# Patient Record
Sex: MALE | Race: WHITE | Employment: OTHER | URBAN - METROPOLITAN AREA
[De-identification: names, ages, dates, MRNs, and addresses within clinical notes are randomized per-mention and may not be internally consistent; named-entity substitution may affect disease eponyms.]

---

## 2017-01-04 ENCOUNTER — OFFICE VISIT (OUTPATIENT)
Dept: NEUROLOGY | Age: 82
End: 2017-01-04

## 2017-01-04 VITALS
SYSTOLIC BLOOD PRESSURE: 152 MMHG | TEMPERATURE: 98.5 F | DIASTOLIC BLOOD PRESSURE: 82 MMHG | OXYGEN SATURATION: 98 % | BODY MASS INDEX: 22.69 KG/M2 | HEART RATE: 77 BPM | HEIGHT: 65 IN | WEIGHT: 136.2 LBS

## 2017-01-04 DIAGNOSIS — G47.50 PARASOMNIA: ICD-10-CM

## 2017-01-04 DIAGNOSIS — G25.0 BENIGN FAMILIAL TREMOR: ICD-10-CM

## 2017-01-04 DIAGNOSIS — G20 PARKINSON'S DISEASE (HCC): ICD-10-CM

## 2017-01-04 DIAGNOSIS — G47.61 PLMD (PERIODIC LIMB MOVEMENT DISORDER): ICD-10-CM

## 2017-01-04 DIAGNOSIS — G25.0 ESSENTIAL TREMOR: Primary | ICD-10-CM

## 2017-01-04 RX ORDER — CARBIDOPA AND LEVODOPA 25; 100 MG/1; MG/1
TABLET ORAL
Qty: 120 TAB | Refills: 5 | Status: SHIPPED | OUTPATIENT
Start: 2017-01-04 | End: 2017-03-27 | Stop reason: ALTCHOICE

## 2017-01-04 NOTE — MR AVS SNAPSHOT
Visit Information Date & Time Provider Department Dept. Phone Encounter #  
 1/4/2017 10:00 AM Marco Pace  [facility] [address] [address] [id number] Follow-up Instructions Return in about 4 months (around 5/4/2017). Follow-up and Disposition History Your Appointments 2/20/2017 10:15 AM  
LAB with [facility] NURSE VISIT Internist of [facility] ([facility]) Appt Note: FASTING LABS; fasting labs [address], Suite [facility] [address]  
  
   
 [address], [address]  
  
    
 2/28/2017  2:00 PM  
Office Visit with Candida Wiley MD  
Internist of [address] [place] [address]) Appt Note: 6 month ov with labs; 6mos ov with labs [address], [address]  
  
   
 [address], [address]  
  
    
 5/4/2017 10:45 AM  
Follow Up with Marco Pace MD  
[facility]) Appt Note: 4mon f/u  
 [address] [address] [phone]  
  
   
 [facility] [address] Upcoming Health Maintenance Date Due DTaP/Tdap/Td series (1 - Tdap) 3/30/1950 MEDICARE YEARLY EXAM 3/30/1994 GLAUCOMA SCREENING Q2Y 2/10/2017 Pneumococcal 65+ Low/Medium Risk (2 of 2 - PPSV23) 8/15/2017 Allergies as of 1/4/2017  Review Complete On: 1/4/2017 By: Marco Pace MD  
  
 Severity Noted Reaction Type Reactions Pcn [Penicillins]  02/16/2012    Nausea and Vomiting Sulfur  02/22/2016    Diarrhea Current Immunizations  Reviewed on 3/2/2015 Name Date Influenza High Dose Vaccine PF 8/26/2016, 10/9/2014 11:45 AM  
 Influenza Vaccine 1/10/2014 Influenza Vaccine (Quad) PF 2/22/2016 Pneumococcal Vaccine (Unspecified Type) 8/15/2012 Not reviewed this visit You Were Diagnosed With   
  
 Codes Comments Essential tremor    -  Primary ICD-10-CM: G25.0 ICD-9-CM: 333.1 Parkinson's disease ([facility] [address]     ICD-10-CM: G20 
ICD-9-CM: 332.0 PLMD (periodic limb movement disorder)     ICD-10-CM: G47.61 ICD-9-CM: 327.51 Parasomnia     ICD-10-CM: G47.50 ICD-9-CM: 307.47 Benign familial tremor     ICD-10-CM: G25.0 ICD-9-CM: 333.1 Vitals BP Pulse Temp Height(growth percentile) Weight(growth percentile) SpO2  
 152/82 77 98.5 °F (36.9 °C) (Oral) 5' 5\" (1.651 m) 136 lb 3.2 oz (61.8 kg) 98% BMI Smoking Status 22.66 kg/m2 Never Smoker BMI and BSA Data Body Mass Index Body Surface Area  
 22.66 kg/m 2 1.68 m 2 Preferred Pharmacy Pharmacy Name Phone [facility] [address], [address] Your Updated Medication List  
  
   
This list is accurate as of: 1/4/17 10:27 AM.  Always use your most recent med list. amLODIPine 2.5 mg tablet Commonly known as:  [place] Take 1 Tab by mouth daily. aspirin delayed-release 81 mg tablet Take  by mouth daily. AZOPT 1 % ophthalmic suspension Generic drug:  brinzolamide  
  
 carbidopa-levodopa  mg per tablet Commonly known as:  SINEMET  
1 tab three  Times  A day with meals and one at bedtime DAILY MULTI 18-0.4 mg Tab Generic drug:  Multivit-Iron-Min-Folic Acid Take  by mouth. finasteride 5 mg tablet Commonly known as:  PROSCAR  
TAKE ONE TABLET BY MOUTH EVERY DAY  
  
 fluticasone 50 mcg/actuation nasal spray Commonly known as:  FLONASE  
2 sprays each nostril daily. montelukast 10 mg tablet Commonly known as:  SINGULAIR Take 1 Tab by mouth daily. polyethylene glycol 17 gram/dose powder Commonly known as:  Deliliah Hefty 17 g daily in liquid, or as directed  
  
 potassium chloride SR 10 mEq tablet Commonly known as:  KLOR-CON 10 Take 2 Tabs by mouth two (2) times a day. terazosin 10 mg capsule Commonly known as:  HYTRIN Take 1 Cap by mouth nightly. * timolol maleate 0.5 % Drpd ophthalmic solution Administer 1 Drop to both eyes daily. * timolol 0.5 % ophthalmic gel-forming Commonly known as:  TIMOPTIC-XE  
  
 triamterene-hydroCHLOROthiazide 37.5-25 mg per tablet Commonly known as:  Hortencia Divine Take 1 Tab by mouth daily. * Notice: This list has 2 medication(s) that are the same as other medications prescribed for you. Read the directions carefully, and ask your doctor or other care provider to review them with you. Prescriptions Sent to Pharmacy Refills  
 carbidopa-levodopa (SINEMET)  mg per tablet 5 Si tab three  Times  A day with meals and one at bedtime Class: Normal  
 Pharmacy: [address], [place]  #: [phone] Follow-up Instructions Return in about 4 months (around 2017). Patient Instructions Reviewed need to pause before starting to walk when going from seated to standing Introducing [facility] & HEALTH SERVICES! [employer] introduces [employer] patient portal. Now you can access parts of your medical record, email your doctor's office, and request medication refills online. 1. In your internet browser, go to [email or web address] 2. Click on the First Time User? Click Here link in the Sign In box. You will see the New Member Sign Up page. 3. Enter your [employer] Access Code exactly as it appears below. You will not need to use this code after youve completed the sign-up process. If you do not sign up before the expiration date, you must request a new code. · [employer] Access Code: [email or web address] Expires: 2017  2:22 PM 
 
4. Enter the last four digits of your Social Security Number (xxxx) and Date of Birth (mm/dd/yyyy) as indicated and click Submit. You will be taken to the next sign-up page. 5. Create a ToolWire ID. This will be your [employer] login ID and cannot be changed, so think of one that is secure and easy to remember. 6. Create a [employer] password. You can change your password at any time. 7. Enter your Password Reset Question and Answer. This can be used at a later time if you forget your password. 8. Enter your e-mail address. You will receive e-mail notification when new information is available in [address] 9. Click Sign Up. You can now view and download portions of your medical record. 10. Click the Download Summary menu link to download a portable copy of your medical information. If you have questions, please visit the Frequently Asked Questions section of the [employer] website. Remember, [employer] is NOT to be used for urgent needs. For medical emergencies, dial 911. Now available from your iPhone and Android! Please provide this summary of care documentation to your next provider. Your primary care clinician is listed as Donn Bingham. Batool James. If you have any questions after today's visit, please call [phone].

## 2017-01-04 NOTE — PROGRESS NOTES
Isidro Riggs             [address] [place] [address]                [facility], [address]    1/4/2017    HPI:  Mansoor Simeon is a 80 y.o., right handed,  White   male, who presents with tremor. Patient reports a tremor for years it is present dose rest and with effort. He is lower bothered by the action tremor having difficulty feeding himself and with handwriting. There is extensive family history of essential tremor. Patient reports stiffness slowness of movement and drooling have as well as balance has been improved. He is able to walk . He has been off of the beta blocker and has experienced tachycardia,His wife reports that he has had bouts of acting out his dreams for the past year or so as well. He is also bothered by constipation. Alternative therapies. Reviewed the MRI exam as showing evidence of chronic small vessel disease. No new strokes. Patient also denies any difficulty taking the Sinemet. He denies specific pain, nausea, with the medications. Patient comes in today in followup gait improved with Sinemet. Patient still falls at times. He admits to shuffling. He is unaware whether medication wears out. He denies any difficulty swallowing or sleep difficulties. His wife confirms this is not acting out dreams any longer. He denies any new difficulties he is still more bothered by action tremor than a resting tremor. Current Outpatient Prescriptions   Medication Sig Dispense Refill    carbidopa-levodopa (SINEMET)  mg per tablet 1 tab three  Times  A day with meals and one at bedtime 120 Tab 5    potassium chloride SR (KLOR-CON 10) 10 mEq tablet Take 2 Tabs by mouth two (2) times a day. 360 Tab 1    polyethylene glycol (MIRALAX) 17 gram/dose powder 17 g daily in liquid, or as directed 510 g 11    amLODIPine (NORVASC) 2.5 mg tablet Take 1 Tab by mouth daily.  90 Tab 3    finasteride (PROSCAR) 5 mg tablet TAKE ONE TABLET BY MOUTH EVERY DAY 90 Tab 3    terazosin (HYTRIN) 10 mg capsule Take 1 Cap by mouth nightly. 90 Cap 3    AZOPT 1 % ophthalmic suspension       timolol (TIMOPTIC-XE) 0.5 % ophthalmic gel-forming       triamterene-hydrochlorothiazide (MAXZIDE) 37.5-25 mg per tablet Take 1 Tab by mouth daily. 90 Tab 3    montelukast (SINGULAIR) 10 mg tablet Take 1 Tab by mouth daily. 30 Tab 5    fluticasone (FLONASE) 50 mcg/actuation nasal spray 2 sprays each nostril daily. 1 Bottle 3    aspirin delayed-release 81 mg tablet Take  by mouth daily.  timolol maleate 0.5 % drpd ophthalmic solution Administer 1 Drop to both eyes daily.  Multivit-Iron-Min-Folic Acid (DAILY MULTI) 18-0.4 mg Tab Take  by mouth.            Past Medical History   Diagnosis Date    Allergic rhinitis     BPH (benign prostatic hyperplasia)     Calculus of kidney     Contact dermatitis and other eczema, due to unspecified cause      skin    Essential tremor     Hypertension     Parkinson's disease ([facility] [address]        Past Surgical History   Procedure Laterality Date    Hx carpal tunnel release       Family History   Problem Relation Age of Onset    Heart Disease Neg Hx      Allergies   Allergen Reactions    Pcn [Penicillins] Nausea and Vomiting    Sulfur Diarrhea       Review of Systems:   Review of Systems - History obtained from the patient  General ROS: negative  Psychological ROS: negative  ENT ROS: positive for - hearing loss worse right  Hematological and Lymphatic ROS: negative  Endocrine ROS: negative  Respiratory ROS: no cough, shortness of breath, or wheezing  Cardiovascular ROS: no chest pain or dyspnea on exertion  Gastrointestinal ROS: no abdominal pain, change in bowel habits, or black or bloody stools  Genito-Urinary ROS: no dysuria, trouble voiding, or hematuria  Musculoskeletal ROS: negative  Neurological ROS: positive for - impaired coordination/balance and tremors  Dermatological ROS: positive for - skin lesion evaluation treament  PHYSICAL EXAMINATION: Visit Vitals    /82    Pulse 77    Temp 98.5 °F (36.9 °C) (Oral)    Ht 5' 5\" (1.651 m)    Wt 61.8 kg (136 lb 3.2 oz)    SpO2 98%    BMI 22.66 kg/m2     General:  Well defined, nourished, and groomed individual in no acute distress. Neck: Supple, nontender, thyroid within normal limits, no JVD, no bruits, no pain with resistance to active range of motion. Heart: Regular rate and rhythm, no murmurs, rub, or gallop. Normal S1S2. Lungs:  Clear to auscultation bilaterally with equal chest expansion, no cough, no wheeze  Musculoskeletal:  Extremities revealed no edema and had full range of motion of joints. Psych:  Good mood and normal affect    NEUROLOGICAL EXAMINATION:     Mental Status:   Alert and oriented to person, place, and time with recent and remote memory intact. Attention span and concentration are normal. Speech is fluent with a full fund of knowledge. Minimal micrographia  Cranial Nerves:    II, III, IV, VI:  Visual acuity grossly intact. Visual fields are normal.    Pupils are equal, round, and reactive to light and accommodation. Extra-ocular movements are full and fluid. , no ptosis or nystagmus. V-XII: Hearing is grossly intact. Facial features are symmetric,  Motor Examination: Normal tone, bulk, and strength, 5/5 muscle strength throughout. No cogwheel rigidity or clonus present. Coordination: .sl  resting tremor both hands, action tremor mild dysmetria lue no loss postural reflexes    Gait and Station: normal based gait with poor  tandem walking. Normal arm swing. No muscle wasting or fasiculations noted. Assessment and Plan:   Meade Soulier is a 80 y.o. right handed male whose history and physical are consistent with parkinsons and familial tremor . Meade Soulier who has risk factors including family history of tremor (both sons and father} hypertension,history cardiac arrhythmia,renal lithiasis    [place] Ervin was seen today for tremors.     Diagnoses and all orders for this visit:    Essential tremor    Parkinson's disease ([facility] [address]    PLMD (periodic limb movement disorder)    Parasomnia    Benign familial tremor    Other orders  -     carbidopa-levodopa (SINEMET)  mg per tablet; 1 tab three  Times  A day with meals and one at bedtime      Follow-up Disposition:  Return in about 4 months (around 5/4/2017). Reviewed risks and benefits of therapy Patient will consider increase bed size and repeat sleep study if acting out dreams increases. Reviewed previous evaluation showing plmd continue current dose sinemet increase sinemet since patient shuffling    I spent 30 minutes with the patient in face-to-face consultation, of which greater than 50% was spent in counseling and coordination of care as described above.

## 2017-02-28 ENCOUNTER — OFFICE VISIT (OUTPATIENT)
Dept: INTERNAL MEDICINE CLINIC | Age: 82
End: 2017-02-28

## 2017-02-28 VITALS
OXYGEN SATURATION: 96 % | DIASTOLIC BLOOD PRESSURE: 72 MMHG | BODY MASS INDEX: 22.73 KG/M2 | HEIGHT: 65 IN | SYSTOLIC BLOOD PRESSURE: 134 MMHG | TEMPERATURE: 98.4 F | RESPIRATION RATE: 12 BRPM | HEART RATE: 92 BPM | WEIGHT: 136.4 LBS

## 2017-02-28 DIAGNOSIS — N40.1 BENIGN NON-NODULAR PROSTATIC HYPERPLASIA WITH LOWER URINARY TRACT SYMPTOMS: ICD-10-CM

## 2017-02-28 DIAGNOSIS — D49.2 SKIN GROWTH: ICD-10-CM

## 2017-02-28 DIAGNOSIS — I10 ESSENTIAL HYPERTENSION WITH GOAL BLOOD PRESSURE LESS THAN 140/90: Primary | ICD-10-CM

## 2017-02-28 DIAGNOSIS — G20 PARKINSON'S DISEASE (HCC): ICD-10-CM

## 2017-02-28 NOTE — MR AVS SNAPSHOT
Visit Information Date & Time Provider Department Dept. Phone Encounter #  
 2/28/2017  2:00 PM Ariadne Grigsby MD Internist of [facility] [id number] Your Appointments 5/4/2017 10:45 AM  
Follow Up with Zabrina Henry MD  
[address]) Appt Note: 4mon f/u  
 [address] [facility] [id number] [phone]  
  
   
 [place] [id number]  
  
    
 9/1/2017  8:35 AM  
LAB with [facility] NURSE VISIT Internist of [facility] ([facility]) Appt Note: lab  
 [address], [address] [place] [address] [address]  
  
   
 [address], [address]  
  
    
 9/8/2017 10:30 AM  
PHYSICAL with Ariadne Grigsby MD  
Internist of [facility]) Appt Note: rpe  
 [address] [facility] [address] [address]  
  
   
 [address], [address] Upcoming Health Maintenance Date Due DTaP/Tdap/Td series (1 - Tdap) 3/30/1950 MEDICARE YEARLY EXAM 3/30/1994 GLAUCOMA SCREENING Q2Y 2/10/2017 Pneumococcal 65+ Low/Medium Risk (2 of 2 - PPSV23) 8/15/2017 Allergies as of 2/28/2017  Review Complete On: 2/28/2017 By: Ariadne Grigsby MD  
  
 Severity Noted Reaction Type Reactions Pcn [Penicillins]  02/16/2012    Nausea and Vomiting Sulfur  02/22/2016    Diarrhea Current Immunizations  Reviewed on 3/2/2015 Name Date Influenza High Dose Vaccine PF 8/26/2016, 10/9/2014 11:45 AM  
 Influenza Vaccine 1/10/2014 Influenza Vaccine (Quad) PF 2/22/2016 Pneumococcal Vaccine (Unspecified Type) 8/15/2012 Not reviewed this visit Vitals BP  
  
  
  
  
  
 134/72 Vitals History BMI and BSA Data Body Mass Index Body Surface Area 22.7 kg/m 2 1.68 m 2 Preferred Pharmacy Pharmacy Name Phone [address] Your Updated Medication List  
  
   
This list is accurate as of: 2/28/17  2:48 PM.  Always use your most recent med list. amLODIPine 2.5 mg tablet Commonly known as:  Ramachandran Aguila Take 1 Tab by mouth daily. aspirin delayed-release 81 mg tablet Take  by mouth daily. AZOPT 1 % ophthalmic suspension Generic drug:  brinzolamide  
  
 carbidopa-levodopa  mg per tablet Commonly known as:  SINEMET  
1 tab three  Times  A day with meals and one at bedtime DAILY MULTI 18-0.4 mg Tab Generic drug:  Multivit-Iron-Min-Folic Acid Take  by mouth. finasteride 5 mg tablet Commonly known as:  PROSCAR  
TAKE ONE TABLET BY MOUTH EVERY DAY  
  
 fluticasone 50 mcg/actuation nasal spray Commonly known as:  FLONASE  
2 sprays each nostril daily. montelukast 10 mg tablet Commonly known as:  SINGULAIR Take 1 Tab by mouth daily. polyethylene glycol 17 gram/dose powder Commonly known as:  Ruiz Ashlee 17 g daily in liquid, or as directed  
  
 potassium chloride SR 10 mEq tablet Commonly known as:  KLOR-CON 10 Take 2 Tabs by mouth two (2) times a day. terazosin 10 mg capsule Commonly known as:  HYTRIN Take 1 Cap by mouth nightly. * timolol maleate 0.5 % Drpd ophthalmic solution Administer 1 Drop to both eyes daily. * timolol 0.5 % ophthalmic gel-forming Commonly known as:  TIMOPTIC-XE  
  
 triamterene-hydroCHLOROthiazide 37.5-25 mg per tablet Commonly known as:  Wander Ferris Take 1 Tab by mouth daily. * Notice: This list has 2 medication(s) that are the same as other medications prescribed for you. Read the directions carefully, and ask your doctor or other care provider to review them with you. Introducing [facility] & HEALTH SERVICES!    
 [employer] introduces [employer] patient portal. Now you can access parts of your medical record, email your doctor's office, and request medication refills online. 1. In your internet browser, go to [email or web address] 2. Click on the First Time User? Click Here link in the Sign In box. You will see the New Member Sign Up page. 3. Enter your [employer] Access Code exactly as it appears below. You will not need to use this code after youve completed the sign-up process. If you do not sign up before the expiration date, you must request a new code. · [employer] Access Code: [email or web address] Expires: 5/29/2017  1:48 PM 
 
4. Enter the last four digits of your Social Security Number (xxxx) and Date of Birth (mm/dd/yyyy) as indicated and click Submit. You will be taken to the next sign-up page. 5. Create a [employer] ID. This will be your [employer] login ID and cannot be changed, so think of one that is secure and easy to remember. 6. Create a [employer] password. You can change your password at any time. 7. Enter your Password Reset Question and Answer. This can be used at a later time if you forget your password. 8. Enter your e-mail address. You will receive e-mail notification when new information is available in [address] 9. Click Sign Up. You can now view and download portions of your medical record. 10. Click the Download Summary menu link to download a portable copy of your medical information. If you have questions, please visit the Frequently Asked Questions section of the [employer] website. Remember, [employer] is NOT to be used for urgent needs. For medical emergencies, dial 911. Now available from your iPhone and Android! Please provide this summary of care documentation to your next provider. Your primary care clinician is listed as Kaleigh Echols. If you have any questions after today's visit, please call [phone].

## 2017-02-28 NOTE — PROGRESS NOTES
Lavaun Olszewski 3/30/1929, is a 80 y.o. male, who is seen today for reevaluation of hypertension BPH Parkinson's disease and also for preoperative evaluation prior to having plastic surgery to remove a growth from his left forehead. He feels well in general but has had problems with passing his urine and remains on Hytrin. Right now he is doing well but when he went to see a urologist he still was not seen after an hour or so he left. Parkinson seems to be stable on current medicine. He takes his blood pressure medicine regularly. He does take low-dose aspirin, 81 mg daily and plans to stop that 10 days before his surgical procedure and restart 2 days later. He is having no exertional chest discomfort no dyspnea on exertion PND orthopnea or edema. Past Medical History:   Diagnosis Date    Allergic rhinitis     BPH (benign prostatic hyperplasia)     Calculus of kidney     Contact dermatitis and other eczema, due to unspecified cause     skin    Essential tremor     Hypertension     Parkinson's disease ([address]      Past Surgical History:   Procedure Laterality Date    HX CARPAL TUNNEL RELEASE       Current Outpatient Prescriptions   Medication Sig Dispense Refill    carbidopa-levodopa (SINEMET)  mg per tablet 1 tab three  Times  A day with meals and one at bedtime 120 Tab 5    potassium chloride SR (KLOR-CON 10) 10 mEq tablet Take 2 Tabs by mouth two (2) times a day. 360 Tab 1    polyethylene glycol (MIRALAX) 17 gram/dose powder 17 g daily in liquid, or as directed 510 g 11    amLODIPine (NORVASC) 2.5 mg tablet Take 1 Tab by mouth daily. 90 Tab 3    finasteride (PROSCAR) 5 mg tablet TAKE ONE TABLET BY MOUTH EVERY DAY 90 Tab 3    terazosin (HYTRIN) 10 mg capsule Take 1 Cap by mouth nightly. 90 Cap 3    AZOPT 1 % ophthalmic suspension       timolol (TIMOPTIC-XE) 0.5 % ophthalmic gel-forming       triamterene-hydrochlorothiazide (MAXZIDE) 37.5-25 mg per tablet Take 1 Tab by mouth daily. 90 Tab 3    montelukast (SINGULAIR) 10 mg tablet Take 1 Tab by mouth daily. 30 Tab 5    fluticasone (FLONASE) 50 mcg/actuation nasal spray 2 sprays each nostril daily. 1 Bottle 3    timolol maleate 0.5 % drpd ophthalmic solution Administer 1 Drop to both eyes daily.  aspirin delayed-release 81 mg tablet Take  by mouth daily.  Multivit-Iron-Min-Folic Acid (DAILY MULTI) 18-0.4 mg Tab Take  by mouth. Allergies   Allergen Reactions    Pcn [Penicillins] Nausea and Vomiting    Sulfur Diarrhea     Social History     Social History    Marital status:      Spouse name: N/A    Number of children: N/A    Years of education: N/A     Social History Main Topics    Smoking status: Never Smoker    Smokeless tobacco: Never Used    Alcohol use Yes      Comment: 2 per week    Drug use: No    Sexual activity: Not Asked     Other Topics Concern    None     Social History Narrative     Visit Vitals    /72    Pulse 92    Temp 98.4 °F (36.9 °C) (Oral)    Resp 12    Ht 5' 5\" (1.651 m)    Wt 136 lb 6.4 oz (61.9 kg)    SpO2 96%    BMI 22.7 kg/m2       Carotids are 2+ without bruits. Lungs are clear to percussion. Good breath sounds with no wheezing or crackles. Heart reveals a regular rhythm with normal S1 and S2 with no murmur gallop click or rub. Apical impulse is not palpable. Abdomen is soft and nontender with no hepatosplenomegaly or masses and no bruits. Extremities reveal no clubbing cyanosis or edema. Pulses are 2+ throughout. Skin reveals a horned growth on his left forehead and numerous other areas where he has had surgery on the top of his head. Assessment: #1. Low risk for upcoming plastic surgical procedure. He is medically cleared for surgery and associated anesthetic and will be stopping aspirin 10 days before hand and he knows that he should not use any nonsteroidals. #2. Hypertension controlled.   He will continue amlodipine 2.5 mg daily and Maxide 25 1 daily. #3.  Parkinson's disease stable. He will continue current medication. #4.  BPH currently doing well, he will continue finasteride and terazosin and will call if urinary symptoms worsen again. At that point he would need to see a urologist.    Follow-up here in 6 months for complete evaluation or sooner if needed    Horacio Soliman MD [facility]    Please note: This document has been produced using voice recognition software. Unrecognized errors in transcription may be present.

## 2017-03-10 ENCOUNTER — HOSPITAL ENCOUNTER (OUTPATIENT)
Dept: LAB | Age: 82
Discharge: HOME | End: 2017-03-10
Payer: MEDICARE

## 2017-03-10 DIAGNOSIS — I10 ESSENTIAL HYPERTENSION WITH GOAL BLOOD PRESSURE LESS THAN 140/90: ICD-10-CM

## 2017-03-10 LAB
ALBUMIN SERPL BCP-MCNC: 3.9 G/DL (ref 3.4–5)
ALBUMIN/GLOB SERPL: 1.5 {RATIO} (ref 0.8–1.7)
ALP SERPL-CCNC: 77 U/L (ref 45–117)
ALT SERPL-CCNC: 15 U/L (ref 16–61)
ANION GAP BLD CALC-SCNC: 6 MMOL/L (ref 3–18)
AST SERPL W P-5'-P-CCNC: 19 U/L (ref 15–37)
BASOPHILS # BLD AUTO: 0 K/UL (ref 0–0.06)
BASOPHILS # BLD: 0 % (ref 0–2)
BILIRUB SERPL-MCNC: 0.7 MG/DL (ref 0.2–1)
BUN SERPL-MCNC: 16 MG/DL (ref 7–18)
BUN/CREAT SERPL: 17 (ref 12–20)
CALCIUM SERPL-MCNC: 9.2 MG/DL (ref 8.5–10.1)
CHLORIDE SERPL-SCNC: 105 MMOL/L (ref 100–108)
CHOLEST SERPL-MCNC: 200 MG/DL
CO2 SERPL-SCNC: 29 MMOL/L (ref 21–32)
CREAT SERPL-MCNC: 0.96 MG/DL (ref 0.6–1.3)
DIFFERENTIAL METHOD BLD: ABNORMAL
EOSINOPHIL # BLD: 0 K/UL (ref 0–0.4)
EOSINOPHIL NFR BLD: 0 % (ref 0–5)
ERYTHROCYTE [DISTWIDTH] IN BLOOD BY AUTOMATED COUNT: 12.9 % (ref 11.6–14.5)
GLOBULIN SER CALC-MCNC: 2.6 G/DL (ref 2–4)
GLUCOSE SERPL-MCNC: 88 MG/DL (ref 74–99)
HCT VFR BLD AUTO: 40.6 % (ref 36–48)
HDLC SERPL-MCNC: 92 MG/DL (ref 40–60)
HDLC SERPL: 2.2 {RATIO} (ref 0–5)
HGB BLD-MCNC: 13.7 G/DL (ref 13–16)
LDLC SERPL CALC-MCNC: 92 MG/DL (ref 0–100)
LIPID PROFILE,FLP: ABNORMAL
LYMPHOCYTES # BLD AUTO: 34 % (ref 21–52)
LYMPHOCYTES # BLD: 1.7 K/UL (ref 0.9–3.6)
MCH RBC QN AUTO: 30.4 PG (ref 24–34)
MCHC RBC AUTO-ENTMCNC: 33.7 G/DL (ref 31–37)
MCV RBC AUTO: 90.2 FL (ref 74–97)
MONOCYTES # BLD: 0.3 K/UL (ref 0.05–1.2)
MONOCYTES NFR BLD AUTO: 7 % (ref 3–10)
NEUTS SEG # BLD: 3 K/UL (ref 1.8–8)
NEUTS SEG NFR BLD AUTO: 59 % (ref 40–73)
PLATELET # BLD AUTO: 146 K/UL (ref 135–420)
PMV BLD AUTO: 9.8 FL (ref 9.2–11.8)
POTASSIUM SERPL-SCNC: 4.1 MMOL/L (ref 3.5–5.5)
PROT SERPL-MCNC: 6.5 G/DL (ref 6.4–8.2)
RBC # BLD AUTO: 4.5 M/UL (ref 4.7–5.5)
SODIUM SERPL-SCNC: 140 MMOL/L (ref 136–145)
TRIGL SERPL-MCNC: 80 MG/DL (ref ?–150)
VLDLC SERPL CALC-MCNC: 16 MG/DL
WBC # BLD AUTO: 5.1 K/UL (ref 4.6–13.2)

## 2017-03-10 PROCEDURE — 85025 COMPLETE CBC W/AUTO DIFF WBC: CPT | Performed by: INTERNAL MEDICINE

## 2017-03-10 PROCEDURE — 80061 LIPID PANEL: CPT | Performed by: INTERNAL MEDICINE

## 2017-03-10 PROCEDURE — 80053 COMPREHEN METABOLIC PANEL: CPT | Performed by: INTERNAL MEDICINE

## 2017-03-10 PROCEDURE — 36415 COLL VENOUS BLD VENIPUNCTURE: CPT | Performed by: INTERNAL MEDICINE

## 2017-03-13 ENCOUNTER — OFFICE VISIT (OUTPATIENT)
Dept: INTERNAL MEDICINE CLINIC | Age: 82
End: 2017-03-13

## 2017-03-13 VITALS
HEIGHT: 65 IN | BODY MASS INDEX: 22.66 KG/M2 | RESPIRATION RATE: 12 BRPM | HEART RATE: 85 BPM | WEIGHT: 136 LBS | TEMPERATURE: 98.5 F | DIASTOLIC BLOOD PRESSURE: 78 MMHG | SYSTOLIC BLOOD PRESSURE: 136 MMHG | OXYGEN SATURATION: 96 %

## 2017-03-13 DIAGNOSIS — N40.1 BENIGN PROSTATIC HYPERPLASIA WITH LOWER URINARY TRACT SYMPTOMS, UNSPECIFIED MORPHOLOGY: Primary | ICD-10-CM

## 2017-03-13 NOTE — MR AVS SNAPSHOT
Visit Information Date & Time Provider Department Dept. Phone Encounter #  
 3/13/2017  2:30 PM Candida Wiley MD Internist of [facility] [id number] Your Appointments 5/4/2017 10:45 AM  
Follow Up with Marco Pace MD  
[facility] Jamaal Argueta) Appt Note: 4mon f/u  
 [address] [facility] [address]  
[phone]  
  
   
 [facility] [address]  
  
    
 9/1/2017  8:35 AM  
LAB with [facility] NURSE VISIT Internist of [facility] (Jamaal Argueta) Appt Note: lab  
 [address], [address] [facility] [address] [place]  
  
   
 [address], [address]  
  
    
 9/8/2017 10:30 AM  
PHYSICAL with Candida Wiley MD  
Internist of [facility] Jamaal Argueta) Appt Note: rpe  
 [address] [facility] [place], [address] [place]  
  
   
 [address], [address] Upcoming Health Maintenance Date Due DTaP/Tdap/Td series (1 - Tdap) 3/30/1950 MEDICARE YEARLY EXAM 3/30/1994 GLAUCOMA SCREENING Q2Y 2/10/2017 Pneumococcal 65+ Low/Medium Risk (2 of 2 - PPSV23) 8/15/2017 Allergies as of 3/13/2017  Review Complete On: 3/13/2017 By: Candida Wiley MD  
  
 Severity Noted Reaction Type Reactions Pcn [Penicillins]  02/16/2012    Nausea and Vomiting Sulfur  02/22/2016    Diarrhea Current Immunizations  Reviewed on 3/2/2015 Name Date Influenza High Dose Vaccine PF 8/26/2016, 10/9/2014 11:45 AM  
 Influenza Vaccine 1/10/2014 Influenza Vaccine (Quad) PF 2/22/2016 Pneumococcal Vaccine (Unspecified Type) 8/15/2012 Not reviewed this visit You Were Diagnosed With   
  
 Codes Comments Benign prostatic hyperplasia with lower urinary tract symptoms, unspecified morphology    -  Primary ICD-10-CM: N40.1 ICD-9-CM: 600.01 Vitals BP Pulse Temp Resp Height(growth percentile) Weight(growth percentile) 136/78 85 98.5 °F (36.9 °C) (Oral) 12 5' 5\" (1.651 m) 136 lb (61.7 kg) SpO2 BMI Smoking Status 96% 22.63 kg/m2 Never Smoker Vitals History BMI and BSA Data Body Mass Index Body Surface Area  
 22.63 kg/m 2 1.68 m 2 Preferred Pharmacy Pharmacy Name Phone [facility] Your Updated Medication List  
  
   
This list is accurate as of: 3/13/17  3:17 PM.  Always use your most recent med list. amLODIPine 2.5 mg tablet Commonly known as:  Ramachandran Aguila Take 1 Tab by mouth daily. aspirin delayed-release 81 mg tablet Take  by mouth daily. AZOPT 1 % ophthalmic suspension Generic drug:  brinzolamide  
  
 carbidopa-levodopa  mg per tablet Commonly known as:  SINEMET  
1 tab three  Times  A day with meals and one at bedtime DAILY MULTI 18-0.4 mg Tab Generic drug:  Multivit-Iron-Min-Folic Acid Take  by mouth. finasteride 5 mg tablet Commonly known as:  PROSCAR  
TAKE ONE TABLET BY MOUTH EVERY DAY  
  
 fluticasone 50 mcg/actuation nasal spray Commonly known as:  FLONASE  
2 sprays each nostril daily. montelukast 10 mg tablet Commonly known as:  SINGULAIR Take 1 Tab by mouth daily. polyethylene glycol 17 gram/dose powder Commonly known as:  Ruiz Ashlee 17 g daily in liquid, or as directed  
  
 potassium chloride SR 10 mEq tablet Commonly known as:  KLOR-CON 10 Take 2 Tabs by mouth two (2) times a day. terazosin 10 mg capsule Commonly known as:  HYTRIN Take 1 Cap by mouth nightly. * timolol maleate 0.5 % Drpd ophthalmic solution Administer 1 Drop to both eyes daily. * timolol 0.5 % ophthalmic gel-forming Commonly known as:  TIMOPTIC-XE  
  
 triamterene-hydroCHLOROthiazide 37.5-25 mg per tablet Commonly known as:  Wander Ferris  
 Take 1 Tab by mouth daily. * Notice: This list has 2 medication(s) that are the same as other medications prescribed for you. Read the directions carefully, and ask your doctor or other care provider to review them with you. We Performed the Following REFERRAL TO UROLOGY [WRF307 Custom] Comments: He would like to see Ulises Tayler, not 1 of his partners Referral Information Referral ID Referred By Referred To  
  
 [id number] Miguel A Morris, [address] [place] [address], [address] Phone: [phone] Fax: [phone] Visits Status Start Date End Date 1 New Request 3/13/17 3/13/18 If your referral has a status of pending review or denied, additional information will be sent to support the outcome of this decision. Introducing [facility] & HEALTH SERVICES! [facility] introduces "[employer] patient portal. Now you can access parts of your medical record, email your doctor's office, and request medication refills online. 1. In your internet browser, go to [email or web address] 2. Click on the First Time User? Click Here link in the Sign In box. You will see the New Member Sign Up page. 3. Enter your "[employer] Access Code exactly as it appears below. You will not need to use this code after youve completed the sign-up process. If you do not sign up before the expiration date, you must request a new code. · "[employer] Access Code: [email or web address] Expires: 5/29/2017  2:48 PM 
 
4. Enter the last four digits of your Social Security Number (xxxx) and Date of Birth (mm/dd/yyyy) as indicated and click Submit. You will be taken to the next sign-up page. 5. Create a "[employer] ID. This will be your "[employer] login ID and cannot be changed, so think of one that is secure and easy to remember. 6. Create a "[employer] password. You can change your password at any time. 7. Enter your Password Reset Question and Answer. This can be used at a later time if you forget your password. 8. Enter your e-mail address. You will receive e-mail notification when new information is available in [address] 9. Click Sign Up. You can now view and download portions of your medical record. 10. Click the Download Summary menu link to download a portable copy of your medical information. If you have questions, please visit the Frequently Asked Questions section of the [employer] website. Remember, [employer] is NOT to be used for urgent needs. For medical emergencies, dial 911. Now available from your iPhone and Android! Please provide this summary of care documentation to your next provider. Your primary care clinician is listed as Antonino Edge. If you have any questions after today's visit, please call [phone].

## 2017-03-13 NOTE — PROGRESS NOTES
Radha So 3/30/1929, is a 80 y.o. male, who is seen today for urinary symptoms. He has been having nocturia 3 and sometimes 4 times per night for quite a while, more recently sometimes even gets up  5 times at night. He goes through spells of urinary urgency such as he has had the last week and similar to what he had in December. Urinalysis at that time was negative. He was referred to urology but waited for urologist about an hour with no one in the waiting room and they did not get to them so we left. He is here with the same symptoms. No dysuria. He is taking all of his medicines correctly. Past Medical History:   Diagnosis Date    Allergic rhinitis     BPH (benign prostatic hyperplasia)     Calculus of kidney     Contact dermatitis and other eczema, due to unspecified cause     skin    Essential tremor     Hypertension     Parkinson's disease (HCC)      Current Outpatient Prescriptions   Medication Sig Dispense Refill    carbidopa-levodopa (SINEMET)  mg per tablet 1 tab three  Times  A day with meals and one at bedtime 120 Tab 5    potassium chloride SR (KLOR-CON 10) 10 mEq tablet Take 2 Tabs by mouth two (2) times a day. 360 Tab 1    polyethylene glycol (MIRALAX) 17 gram/dose powder 17 g daily in liquid, or as directed 510 g 11    amLODIPine (NORVASC) 2.5 mg tablet Take 1 Tab by mouth daily. 90 Tab 3    finasteride (PROSCAR) 5 mg tablet TAKE ONE TABLET BY MOUTH EVERY DAY 90 Tab 3    terazosin (HYTRIN) 10 mg capsule Take 1 Cap by mouth nightly. 90 Cap 3    AZOPT 1 % ophthalmic suspension       timolol (TIMOPTIC-XE) 0.5 % ophthalmic gel-forming       triamterene-hydrochlorothiazide (MAXZIDE) 37.5-25 mg per tablet Take 1 Tab by mouth daily. 90 Tab 3    montelukast (SINGULAIR) 10 mg tablet Take 1 Tab by mouth daily. 30 Tab 5    fluticasone (FLONASE) 50 mcg/actuation nasal spray 2 sprays each nostril daily.  1 Bottle 3    timolol maleate 0.5 % drpd ophthalmic solution Administer 1 Drop to both eyes daily.  aspirin delayed-release 81 mg tablet Take  by mouth daily.  Multivit-Iron-Min-Folic Acid (DAILY MULTI) 18-0.4 mg Tab Take  by mouth. Visit Vitals    /78    Pulse 85    Temp 98.5 °F (36.9 °C) (Oral)    Resp 12    Ht 5' 5\" (1.651 m)    Wt 136 lb (61.7 kg)    SpO2 96%    BMI 22.63 kg/m2     I did not reexamine him today. Assessment: BPH on maximal medical therapy with generic Hytrin and Proscar. I told him again today he really needs urologic studies done by a urologist to see what else can be done for these symptoms, and he does agree to see Dr. Tiffanie Moreira. This visit lasted 25 minutes, greater than 50% of the time was spent counseling on etiology of his symptoms and various possible treatments. We went over a lot of the same things about 3 months ago when he really does not recollect much about that. He is here with a  today who does seem to understand all of this. Follow-up as previously planned    Antonino Jain. Suraj Haro MD FACP    Please note: This document has been produced using voice recognition software. Unrecognized errors in transcription may be present.

## 2017-03-24 ENCOUNTER — TELEPHONE (OUTPATIENT)
Dept: NEUROLOGY | Age: 82
End: 2017-03-24

## 2017-03-27 ENCOUNTER — OFFICE VISIT (OUTPATIENT)
Dept: NEUROLOGY | Age: 82
End: 2017-03-27

## 2017-03-27 VITALS
TEMPERATURE: 98.8 F | SYSTOLIC BLOOD PRESSURE: 177 MMHG | HEART RATE: 67 BPM | BODY MASS INDEX: 21.33 KG/M2 | OXYGEN SATURATION: 98 % | RESPIRATION RATE: 14 BRPM | WEIGHT: 128 LBS | HEIGHT: 65 IN | DIASTOLIC BLOOD PRESSURE: 88 MMHG

## 2017-03-27 DIAGNOSIS — G47.61 PLMD (PERIODIC LIMB MOVEMENT DISORDER): ICD-10-CM

## 2017-03-27 DIAGNOSIS — G47.52 REM SLEEP BEHAVIOR DISORDER: ICD-10-CM

## 2017-03-27 DIAGNOSIS — G25.0 ESSENTIAL TREMOR: Primary | ICD-10-CM

## 2017-03-27 DIAGNOSIS — G20 PARKINSON'S DISEASE (HCC): ICD-10-CM

## 2017-03-27 RX ORDER — TRIAMTERENE/HYDROCHLOROTHIAZID 37.5-25 MG
TABLET ORAL
Qty: 90 TAB | Refills: 0 | Status: SHIPPED | OUTPATIENT
Start: 2017-03-27 | End: 2017-09-08 | Stop reason: ALTCHOICE

## 2017-03-27 NOTE — PROGRESS NOTES
[facility]             [address], [address]    3/27/2017    HPI:  Yesenia Cui is a 80 y.o., right handed,  White   male, who presents with tremor. Patient reports a tremor for years it is present dose rest and with effort. He is lower bothered by the action tremor having difficulty feeding himself and with handwriting. There is extensive family history of essential tremor. Patient reports stiffness slowness of movement and drooling have as well as balance has been improved. He is able to walk . He has been off of the beta blocker and has experienced tachycardia,His wife reports that he has had bouts of acting out his dreams for the past year or so as well. He is also bothered by constipation. Alternative therapies. Reviewed the MRI exam as showing evidence of chronic small vessel disease. No new strokes. Patient also denies any difficulty taking the Sinemet. He denies specific pain, nausea, with the medications. Patient comes in today in followup gait improved with Sinemet. Patient still falls at times. He admits to shuffling. He is unaware whether medication wears out. He denies any difficulty swallowing or sleep difficulties. His wife confirms this is not acting out dreams any longer. Patient reports increased fatigue dizziness. His wife notes that he has not been taking medications correctly. He does need assistance getting up at times. She reports the medication is wearing off at times more than others  Current Outpatient Prescriptions   Medication Sig Dispense Refill    triamterene-hydroCHLOROthiazide (MAXZIDE) 37.5-25 mg per tablet TAKE 1 TABLET BY MOUTH DAILY 90 Tab 0    carbidopa-levodopa (RYTARY) 36. mg cpER Take 36.25 Caps by mouth three (3) times daily. Indications: Parkinsonism 90 Cap 1    potassium chloride SR (KLOR-CON 10) 10 mEq tablet Take 2 Tabs by mouth two (2) times a day.  360 Tab 1    polyethylene glycol (MIRALAX) 17 gram/dose powder 17 g daily in liquid, or as directed 510 g 11    amLODIPine (NORVASC) 2.5 mg tablet Take 1 Tab by mouth daily. 90 Tab 3    finasteride (PROSCAR) 5 mg tablet TAKE ONE TABLET BY MOUTH EVERY DAY 90 Tab 3    terazosin (HYTRIN) 10 mg capsule Take 1 Cap by mouth nightly. 90 Cap 3    AZOPT 1 % ophthalmic suspension       timolol (TIMOPTIC-XE) 0.5 % ophthalmic gel-forming       montelukast (SINGULAIR) 10 mg tablet Take 1 Tab by mouth daily. 30 Tab 5    fluticasone (FLONASE) 50 mcg/actuation nasal spray 2 sprays each nostril daily. 1 Bottle 3    timolol maleate 0.5 % drpd ophthalmic solution Administer 1 Drop to both eyes daily.  aspirin delayed-release 81 mg tablet Take  by mouth daily.  Multivit-Iron-Min-Folic Acid (DAILY MULTI) 18-0.4 mg Tab Take  by mouth.            Past Medical History:   Diagnosis Date    Allergic rhinitis     BPH (benign prostatic hyperplasia)     Calculus of kidney     Contact dermatitis and other eczema, due to unspecified cause     skin    Essential tremor     Hypertension     Parkinson's disease ([facility] [address]        Past Surgical History:   Procedure Laterality Date    HX CARPAL TUNNEL RELEASE       Family History   Problem Relation Age of Onset    Heart Disease Neg Hx      Allergies   Allergen Reactions    Pcn [Penicillins] Nausea and Vomiting    Sulfur Diarrhea       Review of Systems:   Review of Systems - History obtained from the patient  General ROS: negative  Psychological ROS: negative  ENT ROS: positive for - hearing loss worse right  Hematological and Lymphatic ROS: negative  Endocrine ROS: negative  Respiratory ROS: no cough, shortness of breath, or wheezing  Cardiovascular ROS: no chest pain or dyspnea on exertion  Gastrointestinal ROS: no abdominal pain, change in bowel habits, or black or bloody stools  Genito-Urinary ROS: no dysuria, trouble voiding, or hematuria  Musculoskeletal ROS: negative  Neurological ROS: positive for - impaired coordination/balance and tremors  Dermatological ROS: positive for - skin lesion evaluation treament  PHYSICAL EXAMINATION:    Visit Vitals    /88    Pulse 67    Temp 98.8 °F (37.1 °C) (Oral)    Resp 14    Ht 5' 5\" (1.651 m)    Wt 58.1 kg (128 lb)    SpO2 98%    BMI 21.3 kg/m2     General:  Well defined, nourished, and groomed individual in no acute distress. Neck: Supple, nontender, thyroid within normal limits, no JVD, no bruits, no pain with resistance to active range of motion. Heart: Regular rate and rhythm, no murmurs, rub, or gallop. Normal S1S2. Lungs:  Clear to auscultation bilaterally with equal chest expansion, no cough, no wheeze  Musculoskeletal:  Extremities revealed no edema and had full range of motion of joints. Psych:  Good mood and normal affect    NEUROLOGICAL EXAMINATION:     Mental Status:   Alert and oriented to person, place, and time with recent and remote memory intact. Attention span and concentration are normal. Speech is fluent with a full fund of knowledge. Minimal micrographia  Cranial Nerves:    II, III, IV, VI:  Visual acuity grossly intact. Visual fields are normal.    Pupils are equal, round, and reactive to light and accommodation. Extra-ocular movements are full and fluid. , no ptosis or nystagmus. V-XII: Hearing is grossly intact. Facial features are symmetric,  Motor Examination: Normal tone, bulk, and strength, 5/5 muscle strength throughout. No cogwheel rigidity or clonus present. Coordination: .sl  resting tremor both hands, action tremor mild dysmetria lue mild  loss postural reflexes    Gait and Station: normal based gait with poor  tandem walking. Normal arm swing. No muscle wasting or fasiculations noted. Assessment and Plan:   Meade Soulier is a 80 y.o. right handed male whose history and physical are consistent with parkinsons and familial tremor .   Meade Soulier who has risk factors including family history of tremor (both sons and father} hypertension,history cardiac arrhythmia,renal lithiasis    Ramon Celaya was seen today for follow-up and neurologic problem. Diagnoses and all orders for this visit:    Essential tremor    Parkinson's disease ([facility] [address]    PLMD (periodic limb movement disorder)    REM sleep behavior disorder    Other orders  -     carbidopa-levodopa (RYTARY) 36. mg cpER; Take 36.25 Caps by mouth three (3) times daily. Indications: Parkinsonism      Follow-up Disposition:  Return in about 6 weeks (around 5/8/2017). Reviewed risks and benefits of therapy Patient will consider increase bed size and repeat sleep study if acting out dreams increases. Reviewed previous evaluation showing plmd will try Rytary  To increase on time  I spent 30 minutes with the patient in face-to-face consultation, of which greater than 50% was spent in counseling and coordination of care as described above.

## 2017-03-27 NOTE — TELEPHONE ENCOUNTER
[facility] called- RX for rytary was signed with instructions to take 36.26 capsules three times a day. Gave verbal order for 1 capsule three times a day. Re-ordered medication in order to change in chart.

## 2017-03-27 NOTE — MR AVS SNAPSHOT
Visit Information Date & Time Provider Department Dept. Phone Encounter #  
 3/27/2017  9:00 AM Hussain Lundberg  [facility] [address] [address] [id number] Follow-up Instructions Return in about 6 weeks (around 5/8/2017). Follow-up and Disposition History Your Appointments 9/1/2017  8:35 AM  
LAB with [facility] NURSE VISIT Internist of [facility] ([address] [place] [address] Road) Appt Note: lab  
 [address], [address] [facility] [address] [place]  
  
   
 [address], [address]  
  
    
 9/8/2017 10:30 AM  
PHYSICAL with Danny Marcus MD  
Internist of [address]) Appt Note: rpe  
 [address] [facility], [address] [facility] [address] [address] [address] [place]  
  
   
 [address], [address] 4/19/2017  2:15 PM  
Any with Christina White MD  
[facility] of [facility] ([address] [place] [address]) Appt Note: Np dx BPH w/ LUTS, Ref Dr Juliana Dick [phone], [facility] [address] [place] [id number]  
[address] [facility] [address] [facility] [address] [place] [address] Upcoming Health Maintenance Date Due DTaP/Tdap/Td series (1 - Tdap) 3/30/1950 MEDICARE YEARLY EXAM 3/30/1994 GLAUCOMA SCREENING Q2Y 2/10/2017 Pneumococcal 65+ Low/Medium Risk (2 of 2 - PPSV23) 8/15/2017 Allergies as of 3/27/2017  Review Complete On: 3/27/2017 By: Liz Harper LPN Severity Noted Reaction Type Reactions Pcn [Penicillins]  02/16/2012    Nausea and Vomiting Sulfur  02/22/2016    Diarrhea Current Immunizations  Reviewed on 3/2/2015 Name Date Influenza High Dose Vaccine PF 8/26/2016, 10/9/2014 11:45 AM  
 Influenza Vaccine 1/10/2014 Influenza Vaccine (Quad) PF 2/22/2016 Pneumococcal Vaccine (Unspecified Type) 8/15/2012 Not reviewed this visit You Were Diagnosed With   
  
 Codes Comments Essential tremor    -  Primary ICD-10-CM: G25.0 ICD-9-CM: 333.1 Parkinson's disease ([facility] [address]     ICD-10-CM: G20 
ICD-9-CM: 332.0 PLMD (periodic limb movement disorder)     ICD-10-CM: G47.61 ICD-9-CM: 327.51   
 REM sleep behavior disorder     ICD-10-CM: G47.52 
ICD-9-CM: 327.42 Vitals BP Pulse Temp Resp Height(growth percentile) Weight(growth percentile) 177/88 67 98.8 °F (37.1 °C) (Oral) 14 5' 5\" (1.651 m) 128 lb (58.1 kg) SpO2 BMI Smoking Status 98% 21.3 kg/m2 Never Smoker BMI and BSA Data Body Mass Index Body Surface Area  
 21.3 kg/m 2 1.63 m 2 Preferred Pharmacy Pharmacy Name Phone Katrin Mehta, [place] [address] [facility] Your Updated Medication List  
  
   
This list is accurate as of: 3/27/17 10:17 AM.  Always use your most recent med list. amLODIPine 2.5 mg tablet Commonly known as:  Front Royal Blade Take 1 Tab by mouth daily. aspirin delayed-release 81 mg tablet Take  by mouth daily. AZOPT 1 % ophthalmic suspension Generic drug:  brinzolamide  
  
 carbidopa-levodopa 36. mg Cper Commonly known as:  RYTARY Take 36.25 Caps by mouth three (3) times daily. Indications: Parkinsonism DAILY MULTI 18-0.4 mg Tab Generic drug:  Multivit-Iron-Min-Folic Acid Take  by mouth. finasteride 5 mg tablet Commonly known as:  PROSCAR  
TAKE ONE TABLET BY MOUTH EVERY DAY  
  
 fluticasone 50 mcg/actuation nasal spray Commonly known as:  FLONASE  
2 sprays each nostril daily. montelukast 10 mg tablet Commonly known as:  SINGULAIR Take 1 Tab by mouth daily. polyethylene glycol 17 gram/dose powder Commonly known as:  Henrey Keke 17 g daily in liquid, or as directed  
  
 potassium chloride SR 10 mEq tablet Commonly known as:  KLOR-CON 10 Take 2 Tabs by mouth two (2) times a day. terazosin 10 mg capsule Commonly known as:  HYTRIN  
 Take 1 Cap by mouth nightly. * timolol maleate 0.5 % Drpd ophthalmic solution Administer 1 Drop to both eyes daily. * timolol 0.5 % ophthalmic gel-forming Commonly known as:  TIMOPTIC-XE  
  
 triamterene-hydroCHLOROthiazide 37.5-25 mg per tablet Commonly known as:  Bobby Brock TAKE 1 TABLET BY MOUTH DAILY * Notice: This list has 2 medication(s) that are the same as other medications prescribed for you. Read the directions carefully, and ask your doctor or other care provider to review them with you. Prescriptions Sent to Pharmacy Refills  
 carbidopa-levodopa (RYTARY) 36. mg cpER 1 Sig: Take 36.25 Caps by mouth three (3) times daily. Indications: Parkinsonism Class: Normal  
 Pharmacy: [address], [place]  #: [phone] Route: Oral  
  
Follow-up Instructions Return in about 6 weeks (around 5/8/2017). Introducing [facility]! Sherly Bragg introduces [email or web address] patient portal. Now you can access parts of your medical record, email your doctor's office, and request medication refills online. 1. In your internet browser, go to [email or web address] [email or web address] 2. Click on the First Time User? Click Here link in the Sign In box. You will see the New Member Sign Up page. 3. Enter your [email or web address] Access Code exactly as it appears below. You will not need to use this code after youve completed the sign-up process. If you do not sign up before the expiration date, you must request a new code. · [email or web address] Access Code: [email or web address] Expires: 5/29/2017  2:48 PM 
 
4. Enter the last four digits of your Social Security Number (xxxx) and Date of Birth (mm/dd/yyyy) as indicated and click Submit. You will be taken to the next sign-up page. 5. Create a [email or web address] ID. This will be your [email or web address] login ID and cannot be changed, so think of one that is secure and easy to remember. 6. Create a [employer] password. You can change your password at any time. 7. Enter your Password Reset Question and Answer. This can be used at a later time if you forget your password. 8. Enter your e-mail address. You will receive e-mail notification when new information is available in [address] 9. Click Sign Up. You can now view and download portions of your medical record. 10. Click the Download Summary menu link to download a portable copy of your medical information. If you have questions, please visit the Frequently Asked Questions section of the [employer] website. Remember, [employer] is NOT to be used for urgent needs. For medical emergencies, dial 911. Now available from your iPhone and Android! Please provide this summary of care documentation to your next provider. Your primary care clinician is listed as Jose Clayton. If you have any questions after today's visit, please call [phone].

## 2017-05-12 ENCOUNTER — OFFICE VISIT (OUTPATIENT)
Dept: NEUROLOGY | Age: 82
End: 2017-05-12

## 2017-05-12 VITALS
BODY MASS INDEX: 21.89 KG/M2 | OXYGEN SATURATION: 98 % | DIASTOLIC BLOOD PRESSURE: 72 MMHG | HEIGHT: 65 IN | WEIGHT: 131.4 LBS | TEMPERATURE: 98.5 F | HEART RATE: 69 BPM | SYSTOLIC BLOOD PRESSURE: 110 MMHG

## 2017-05-12 DIAGNOSIS — G20 PARKINSON'S DISEASE (HCC): Primary | ICD-10-CM

## 2017-05-12 DIAGNOSIS — G47.52 REM SLEEP BEHAVIOR DISORDER: ICD-10-CM

## 2017-05-12 DIAGNOSIS — G47.61 PLMD (PERIODIC LIMB MOVEMENT DISORDER): ICD-10-CM

## 2017-05-12 DIAGNOSIS — G25.0 ESSENTIAL TREMOR: ICD-10-CM

## 2017-05-12 NOTE — MR AVS SNAPSHOT
Visit Information Date & Time Provider Department Dept. Phone Encounter #  
 5/12/2017  1:00 PM Candis Tafoya MD 1818 [facility] [address] [phone] [id number] Follow-up Instructions Return in about 2 months (around 7/12/2017). Follow-up and Disposition History Your Appointments 7/14/2017 10:00 AM  
Follow Up with Candis Tafoya MD  
[address] Appt Note: 2 month fu  
 303 [facility] [id number] [phone]  
  
   
 [facility] [address]  
  
    
 9/1/2017  8:35 AM  
LAB with [facility] NURSE VISIT Internist of [facility] ([facility]) Appt Note: lab  
 [address], [address] [facility] [address] [place]  
  
   
 [address], [address]  
  
    
 9/8/2017 10:30 AM  
PHYSICAL with Danielle Hernandez MD  
Internist of [facility]) Appt Note: rpe  
 5445 [facility], [address] [facility] [address] [place]  
  
   
 [address], [address]  
  
    
  
 5/16/2017  3:00 PM  
Any with Jelani Cary MD  
Urology of [facility] ([facility]) Appt Note: Np dx BPH w/ LUTS, Ref Dr Carroll  [phone], notes CC epic; Dr Andrews Lal; [facility] to call to jodi due to Dr Deonte Segovia not being in th office; Mailed appt Dennys Hernandez [address] [place] [id number]  
[address] [facility] 301 [facility] [address] [place] [address] Upcoming Health Maintenance Date Due DTaP/Tdap/Td series (1 - Tdap) 3/30/1950 MEDICARE YEARLY EXAM 3/30/1994 GLAUCOMA SCREENING Q2Y 2/10/2017 INFLUENZA AGE 9 TO ADULT 8/1/2017 Pneumococcal 65+ Low/Medium Risk (2 of 2 - PPSV23) 8/15/2017 Allergies as of 5/12/2017  Review Complete On: 5/12/2017 By: Candis Tafoya MD  
  
 Severity Noted Reaction Type Reactions Pcn [Penicillins]  02/16/2012    Nausea and Vomiting Sulfur  02/22/2016    Diarrhea Current Immunizations  Reviewed on 3/2/2015 Name Date Influenza High Dose Vaccine PF 8/26/2016, 10/9/2014 11:45 AM  
 Influenza Vaccine 1/10/2014 Influenza Vaccine (Quad) PF 2/22/2016 Pneumococcal Vaccine (Unspecified Type) 8/15/2012 Not reviewed this visit You Were Diagnosed With   
  
 Codes Comments [facility]'s disease ([facility]    -  Primary ICD-10-CM: G20 
ICD-9-CM: 332.0 Essential tremor     ICD-10-CM: G25.0 ICD-9-CM: 333.1 PLMD (periodic limb movement disorder)     ICD-10-CM: G47.61 ICD-9-CM: 327.51   
 REM sleep behavior disorder     ICD-10-CM: G47.52 
ICD-9-CM: 327.42 Vitals BP Pulse Temp Height(growth percentile) Weight(growth percentile) SpO2  
 110/72 69 98.5 °F (36.9 °C) (Oral) 5' 5\" (1.651 m) 131 lb 6.4 oz (59.6 kg) 98% BMI Smoking Status 21.87 kg/m2 Never Smoker BMI and BSA Data Body Mass Index Body Surface Area  
 21.87 kg/m 2 1.65 m 2 [place] [place] [place] [id number] Your Updated Medication List  
  
   
This list is accurate as of: 5/12/17  1:45 PM.  Always use your most recent med list. amLODIPine 2.5 mg tablet Commonly known as:  Jc Weber Take 1 Tab by mouth daily. aspirin delayed-release 81 mg tablet Take  by mouth daily. AZOPT 1 % ophthalmic suspension Generic drug:  brinzolamide  
  
 carbidopa-levodopa 36. mg Cper Commonly known as:  RYTARY Take 1 Cap by mouth three (3) times daily. Indications: Parkinsonism DAILY MULTI 18-0.4 mg Tab Generic drug:  Multivit-Iron-Min-Folic Acid Take  by mouth. finasteride 5 mg tablet Commonly known as:  PROSCAR  
TAKE ONE TABLET BY MOUTH EVERY DAY  
  
 fluticasone 50 mcg/actuation nasal spray Commonly known as:  Rena Almonte 2 sprays each nostril daily. montelukast 10 mg tablet Commonly known as:  SINGULAIR Take 1 Tab by mouth daily. polyethylene glycol 17 gram/dose powder Commonly known as:  Bonnita Amass 17 g daily in liquid, or as directed  
  
 potassium chloride SR 10 mEq tablet Commonly known as:  KLOR-CON 10 Take 2 Tabs by mouth two (2) times a day. terazosin 10 mg capsule Commonly known as:  HYTRIN Take 1 Cap by mouth nightly. * timolol maleate 0.5 % Drpd ophthalmic solution Administer 1 Drop to both eyes daily. * timolol 0.5 % ophthalmic gel-forming Commonly known as:  TIMOPTIC-XE  
  
 triamterene-hydroCHLOROthiazide 37.5-25 mg per tablet Commonly known as:  Veleta Schools TAKE 1 TABLET BY MOUTH DAILY * Notice: This list has 2 medication(s) that are the same as other medications prescribed for you. Read the directions carefully, and ask your doctor or other care provider to review them with you. Prescriptions Sent to Pharmacy Refills  
 carbidopa-levodopa (RYTARY) 36. mg cpER 2 Sig: Take 1 Cap by mouth three (3) times daily. Indications: Parkinsonism Class: Normal  
 Pharmacy: [address] #: [phone] Route: Oral  
  
Follow-up Instructions Return in about 2 months (around 7/12/2017). Patient Instructions Take medication three times aday Introducing [facility]! [employer] introduces Advanced Electron Beams patient portal. Now you can access parts of your medical record, email your doctor's office, and request medication refills online. 1. In your internet browser, go to [email or web address] 2. Click on the First Time User? Click Here link in the Sign In box. You will see the New Member Sign Up page. 3. Enter your Advanced [employer] Beams Access Code exactly as it appears below. You will not need to use this code after youve completed the sign-up process.  If you do not sign up before the expiration date, you must request a new code. · Rumgr Access Code: [email or web address] Expires: 5/29/2017  2:48 PM 
 
4. Enter the last four digits of your Social Security Number (xxxx) and Date of Birth (mm/dd/yyyy) as indicated and click Submit. You will be taken to the next sign-up page. 5. Create a Rumgr ID. This will be your Rumgr login ID and cannot be changed, so think of one that is secure and easy to remember. 6. Create a Rumgr password. You can change your password at any time. 7. Enter your Password Reset Question and Answer. This can be used at a later time if you forget your password. 8. Enter your e-mail address. You will receive e-mail notification when new information is available in [address] 9. Click Sign Up. You can now view and download portions of your medical record. 10. Click the Download Summary menu link to download a portable copy of your medical information. If you have questions, please visit the Frequently Asked Questions section of the Rumgr website. Remember, Rumgr is NOT to be used for urgent needs. For medical emergencies, dial 911. Now available from your iPhone and Android! Please provide this summary of care documentation to your next provider. Your primary care clinician is listed as Harish Clinton. Elizabeth Dove. If you have any questions after today's visit, please call [phone].

## 2017-05-12 NOTE — PROGRESS NOTES
[facility]             [address] Dangelo, [address]    5/12/2017    HPI:  Dawn Winkelr is a 80 y.o., right handed,  White   male, who presents with tremor. Patient reports a tremor for years it is present dose rest and with effort. He is lower bothered by the action tremor having difficulty feeding himself and with handwriting. There is extensive family history of essential tremor. Patient reports stiffness slowness of movement and drooling have as well as balance has been improved. He is able to walk . He has been off of the beta blocker and has experienced tachycardia,His wife reports that he has had bouts of acting out his dreams for the past year or so as well. He is also bothered by constipation. Alternative therapies. Reviewed the MRI exam as showing evidence of chronic small vessel disease. No new strokes. Patient also denies any difficulty taking the Sinemet. He denies specific pain, nausea, with the medications. Patient comes in today in followup gait improved with Sinemet. Patient still falls at times. He admits to shuffling. He is unaware whether medication wears out. He denies any difficulty swallowing or sleep difficulties. His wife confirms this is not acting out dreams any longer. Patient reports increased fatigue dizziness. His wife notes that he has not been taking medications correctly. He does need assistance getting up at times. She reports the medication is wearing off at times more than others but he only takes twice aday  Current Outpatient Prescriptions   Medication Sig Dispense Refill    carbidopa-levodopa (RYTARY) 36. mg cpER Take 1 Cap by mouth three (3) times daily.  Indications: Parkinsonism 90 Cap 2    triamterene-hydroCHLOROthiazide (MAXZIDE) 37.5-25 mg per tablet TAKE 1 TABLET BY MOUTH DAILY 90 Tab 0    potassium chloride SR (KLOR-CON 10) 10 mEq tablet Take 2 Tabs by mouth two (2) times a day. 360 Tab 1    polyethylene glycol (MIRALAX) 17 gram/dose powder 17 g daily in liquid, or as directed 510 g 11    amLODIPine (NORVASC) 2.5 mg tablet Take 1 Tab by mouth daily. 90 Tab 3    finasteride (PROSCAR) 5 mg tablet TAKE ONE TABLET BY MOUTH EVERY DAY 90 Tab 3    terazosin (HYTRIN) 10 mg capsule Take 1 Cap by mouth nightly. 90 Cap 3    AZOPT 1 % ophthalmic suspension       timolol (TIMOPTIC-XE) 0.5 % ophthalmic gel-forming       montelukast (SINGULAIR) 10 mg tablet Take 1 Tab by mouth daily. 30 Tab 5    fluticasone (FLONASE) 50 mcg/actuation nasal spray 2 sprays each nostril daily. 1 Bottle 3    aspirin delayed-release 81 mg tablet Take  by mouth daily.  Multivit-Iron-Min-Folic Acid (DAILY MULTI) 18-0.4 mg Tab Take  by mouth.  timolol maleate 0.5 % drpd ophthalmic solution Administer 1 Drop to both eyes daily.          Past Medical History:   Diagnosis Date    Allergic rhinitis     BPH (benign prostatic hyperplasia)     Calculus of kidney     Contact dermatitis and other eczema, due to unspecified cause     skin    Essential tremor     Hypertension     Parkinson's disease ([facility]        Past Surgical History:   Procedure Laterality Date    HX CARPAL TUNNEL RELEASE       Family History   Problem Relation Age of Onset    Heart Disease Neg Hx      Allergies   Allergen Reactions    Pcn [Penicillins] Nausea and Vomiting    Sulfur Diarrhea       Review of Systems:   Review of Systems - History obtained from the patient  General ROS: negative  Psychological ROS: negative  ENT ROS: positive for - hearing loss worse right  Hematological and Lymphatic ROS: negative  Endocrine ROS: negative  Respiratory ROS: no cough, shortness of breath, or wheezing  Cardiovascular ROS: no chest pain or dyspnea on exertion  Gastrointestinal ROS: no abdominal pain, change in bowel habits, or black or bloody stools  Genito-Urinary ROS: no dysuria, trouble voiding, or hematuria  Musculoskeletal ROS: negative  Neurological ROS: positive for - impaired coordination/balance and tremors  Dermatological ROS: positive for - skin lesion evaluation treament  PHYSICAL EXAMINATION:    Visit Vitals    /72    Pulse 69    Temp 98.5 °F (36.9 °C) (Oral)    Ht 5' 5\" (1.651 m)    Wt 59.6 kg (131 lb 6.4 oz)    SpO2 98%    BMI 21.87 kg/m2     General:  Well defined, nourished, and groomed individual in no acute distress. Neck: Supple, nontender, thyroid within normal limits, no JVD, no bruits, no pain with resistance to active range of motion. Heart: Regular rate and rhythm, no murmurs, rub, or gallop. Normal S1S2. Lungs:  Clear to auscultation bilaterally with equal chest expansion, no cough, no wheeze  Musculoskeletal:  Extremities revealed no edema and had full range of motion of joints. Psych:  Good mood and normal affect    NEUROLOGICAL EXAMINATION:     Mental Status:   Alert and oriented to person, place, nottime with recent and remote memory intact. Attention span and concentration are normal. Speech is fluent with a fair fund of knowledge. Increased confusion re meds and decreased comprehension  Minimal micrographia  Cranial Nerves:    II, III, IV, VI:  Visual acuity grossly intact. Visual fields are normal.    Pupils are equal, round, and reactive to light and accommodation. Extra-ocular movements are full and fluid. , no ptosis or nystagmus. V-XII: Hearing is grossly intact. Facial features are symmetric,  Motor Examination: Normal tone, bulk, and strength, 5/5 muscle strength throughout. No cogwheel rigidity or clonus present. Coordination: .sl  resting tremor both hands, action tremor mild dysmetria lue mild  loss postural reflexes    Gait and Station: normal based gait with poor  tandem walking. Normal arm swing. No muscle wasting or fasiculations noted.      Assessment and Plan:   Connor López is a 80 y.o. right handed male whose history and physical are consistent with parkinsons and familial tremor . Juvenal Jc who has risk factors including family history of tremor (both sons and father} hypertension,history cardiac arrhythmia,renal lithiasis    Khanh Patches was seen today for tremors. Diagnoses and all orders for this visit:    Parkinson's disease ([address]    Essential tremor    PLMD (periodic limb movement disorder)    REM sleep behavior disorder    Other orders  -     carbidopa-levodopa (RYTARY) 36. mg cpER; Take 1 Cap by mouth three (3) times daily. Indications: Parkinsonism      Follow-up Disposition:  Return in about 2 months (around 7/12/2017). Reviewed risks and benefits of therapy  Reviewed previous evaluation showing plmd will try Rytary  To increase on time  I spent 30 minutes with the patient in face-to-face consultation, of which greater than 50% was spent in counseling and coordination of care as described above.

## 2017-05-16 PROBLEM — G20 PARKINSON DISEASE (HCC): Status: ACTIVE | Noted: 2017-05-16

## 2017-05-16 PROBLEM — R33.9 URINARY RETENTION: Status: ACTIVE | Noted: 2017-05-16

## 2017-07-24 ENCOUNTER — HOSPITAL ENCOUNTER (OUTPATIENT)
Dept: LAB | Age: 82
Discharge: HOME | End: 2017-07-24
Payer: MEDICARE

## 2017-07-24 DIAGNOSIS — Z01.812 BLOOD TESTS PRIOR TO TREATMENT OR PROCEDURE: ICD-10-CM

## 2017-07-24 LAB
ALBUMIN SERPL BCP-MCNC: 4 G/DL (ref 3.4–5)
ALBUMIN/GLOB SERPL: 1.5 {RATIO} (ref 0.8–1.7)
ALP SERPL-CCNC: 81 U/L (ref 45–117)
ALT SERPL-CCNC: 14 U/L (ref 16–61)
ANION GAP BLD CALC-SCNC: 8 MMOL/L (ref 3–18)
AST SERPL W P-5'-P-CCNC: 18 U/L (ref 15–37)
ATRIAL RATE: 81 BPM
BILIRUB SERPL-MCNC: 0.5 MG/DL (ref 0.2–1)
BUN SERPL-MCNC: 16 MG/DL (ref 7–18)
BUN/CREAT SERPL: 14 (ref 12–20)
CALCIUM SERPL-MCNC: 9.8 MG/DL (ref 8.5–10.1)
CALCULATED P AXIS, ECG09: 40 DEGREES
CALCULATED R AXIS, ECG10: 7 DEGREES
CALCULATED T AXIS, ECG11: 42 DEGREES
CHLORIDE SERPL-SCNC: 105 MMOL/L (ref 100–108)
CO2 SERPL-SCNC: 28 MMOL/L (ref 21–32)
CREAT SERPL-MCNC: 1.11 MG/DL (ref 0.6–1.3)
DIAGNOSIS, 93000: NORMAL
ERYTHROCYTE [DISTWIDTH] IN BLOOD BY AUTOMATED COUNT: 12.4 % (ref 11.6–14.5)
GLOBULIN SER CALC-MCNC: 2.7 G/DL (ref 2–4)
GLUCOSE SERPL-MCNC: 123 MG/DL (ref 74–99)
HCT VFR BLD AUTO: 43.4 % (ref 36–48)
HGB BLD-MCNC: 14.8 G/DL (ref 13–16)
MCH RBC QN AUTO: 31.3 PG (ref 24–34)
MCHC RBC AUTO-ENTMCNC: 34.1 G/DL (ref 31–37)
MCV RBC AUTO: 91.8 FL (ref 74–97)
P-R INTERVAL, ECG05: 196 MS
PLATELET # BLD AUTO: 163 K/UL (ref 135–420)
PMV BLD AUTO: 10.3 FL (ref 9.2–11.8)
POTASSIUM SERPL-SCNC: 3.6 MMOL/L (ref 3.5–5.5)
PROT SERPL-MCNC: 6.7 G/DL (ref 6.4–8.2)
Q-T INTERVAL, ECG07: 356 MS
QRS DURATION, ECG06: 84 MS
QTC CALCULATION (BEZET), ECG08: 413 MS
RBC # BLD AUTO: 4.73 M/UL (ref 4.7–5.5)
SODIUM SERPL-SCNC: 141 MMOL/L (ref 136–145)
VENTRICULAR RATE, ECG03: 81 BPM
WBC # BLD AUTO: 5.4 K/UL (ref 4.6–13.2)

## 2017-07-24 PROCEDURE — 93005 ELECTROCARDIOGRAM TRACING: CPT

## 2017-08-04 ENCOUNTER — TELEPHONE (OUTPATIENT)
Dept: INTERNAL MEDICINE CLINIC | Age: 82
End: 2017-08-04

## 2017-08-04 ENCOUNTER — OFFICE VISIT (OUTPATIENT)
Dept: INTERNAL MEDICINE CLINIC | Age: 82
End: 2017-08-04

## 2017-08-04 VITALS
TEMPERATURE: 97.7 F | HEART RATE: 91 BPM | BODY MASS INDEX: 21.36 KG/M2 | RESPIRATION RATE: 14 BRPM | DIASTOLIC BLOOD PRESSURE: 74 MMHG | OXYGEN SATURATION: 97 % | SYSTOLIC BLOOD PRESSURE: 138 MMHG | WEIGHT: 128.2 LBS | HEIGHT: 65 IN

## 2017-08-04 DIAGNOSIS — R33.9 URINARY RETENTION: ICD-10-CM

## 2017-08-04 DIAGNOSIS — L98.9 SKIN LESIONS: ICD-10-CM

## 2017-08-04 DIAGNOSIS — N40.0 BENIGN PROSTATIC HYPERPLASIA, PRESENCE OF LOWER URINARY TRACT SYMPTOMS UNSPECIFIED, UNSPECIFIED MORPHOLOGY: ICD-10-CM

## 2017-08-04 DIAGNOSIS — I10 ESSENTIAL HYPERTENSION WITH GOAL BLOOD PRESSURE LESS THAN 140/90: Primary | ICD-10-CM

## 2017-08-04 DIAGNOSIS — Z01.818 PREOPERATIVE CLEARANCE: ICD-10-CM

## 2017-08-04 DIAGNOSIS — G20 PARKINSON DISEASE (HCC): ICD-10-CM

## 2017-08-04 DIAGNOSIS — E78.5 HYPERLIPIDEMIA, UNSPECIFIED HYPERLIPIDEMIA TYPE: ICD-10-CM

## 2017-08-04 NOTE — PROGRESS NOTES
1. Have you been to the ER, urgent care clinic or hospitalized since your last visit? NO.     2. Have you seen or consulted any other health care providers outside of the [address] [place] [address] since your last visit (Include any pap smears or colon screening)? NO      Do you have an Advanced Directive? NO    Would you like information on Advanced Directives?  NO

## 2017-08-04 NOTE — TELEPHONE ENCOUNTER
Please fax clearance note asap pt has surgery Monday 7th    [facility] fax # [phone]

## 2017-08-04 NOTE — MR AVS SNAPSHOT
Visit Information Date & Time Provider Department Dept. Phone Encounter #  
 8/4/2017  9:00 AM Cristi Fernandes, [address] Internist of [facility] [place] [id number] Your Appointments 9/1/2017  8:35 AM  
LAB with [facility] NURSE VISIT Internist of [facility] ([address]) Appt Note: lab  
 [address], [address] [place] [address] [place]  
  
   
 [address], [address]  
  
    
 9/8/2017 10:30 AM  
PHYSICAL with Edmond Garcia MD  
Internist of [facility] [address] [facility]) Appt Note: rpe  
 54[address] [facility], [address] [address] [address] [place]  
  
   
 [address], [address] Upcoming Health Maintenance Date Due DTaP/Tdap/Td series (1 - Tdap) 3/30/1950 MEDICARE YEARLY EXAM 3/30/1994 GLAUCOMA SCREENING Q2Y 2/10/2017 INFLUENZA AGE 9 TO ADULT 8/1/2017 Pneumococcal 65+ Low/Medium Risk (2 of 2 - PPSV23) 8/15/2017 Allergies as of 8/4/2017  Review Complete On: 8/4/2017 By: Lance Medeiros Severity Noted Reaction Type Reactions Pcn [Penicillins]  02/16/2012    Nausea and Vomiting Sulfur  02/22/2016    Diarrhea Current Immunizations  Reviewed on 3/2/2015 Name Date Influenza High Dose Vaccine PF 8/26/2016, 10/9/2014 11:45 AM  
 Influenza Vaccine 1/10/2014 Influenza Vaccine (Quad) PF 2/22/2016 ZZZ-RETIRED (DO NOT USE) Pneumococcal Vaccine (Unspecified Type) 8/15/2012 Not reviewed this visit Vitals BP Pulse Temp Resp Height(growth percentile) Weight(growth percentile) 138/74 (BP 1 Location: Right arm, BP Patient Position: Sitting) 91 97.7 °F (36.5 °C) (Oral) 14 5' 5\" (1.651 m) 128 lb 3.2 oz (58.2 kg) SpO2 BMI Smoking Status 97% 21.33 kg/m2 Never Smoker Vitals History BMI and BSA Data Body Mass Index Body Surface Area  
 21.33 kg/m 2 1.63 m 2 Preferred Pharmacy Pharmacy Name Phone [facility]alie Ferreira [facility] Your Updated Medication List  
  
   
This list is accurate as of: 8/4/17  9:12 AM.  Always use your most recent med list. amLODIPine 2.5 mg tablet Commonly known as:  Petra Thomas Take 1 Tab by mouth daily. aspirin delayed-release 81 mg tablet Take  by mouth daily. AZOPT 1 % ophthalmic suspension Generic drug:  brinzolamide  
  
 carbidopa-levodopa ER 36. mg per capsule Commonly known as:  RYTARY Take 1 Cap by mouth three (3) times daily. Indications: Parkinsonism DAILY MULTI 18-0.4 mg Tab Generic drug:  Multivit-Iron-Min-Folic Acid Take  by mouth. finasteride 5 mg tablet Commonly known as:  PROSCAR  
TAKE ONE TABLET BY MOUTH EVERY DAY  
  
 fluticasone 50 mcg/actuation nasal spray Commonly known as:  FLONASE  
2 sprays each nostril daily. montelukast 10 mg tablet Commonly known as:  SINGULAIR Take 1 Tab by mouth daily. polyethylene glycol 17 gram/dose powder Commonly known as:  Alford Baumgarten 17 g daily in liquid, or as directed  
  
 potassium chloride SR 10 mEq tablet Commonly known as:  KLOR-CON 10 Take 2 Tabs by mouth two (2) times a day. terazosin 10 mg capsule Commonly known as:  HYTRIN Take 1 Cap by mouth nightly. * timolol maleate 0.5 % Drpd ophthalmic solution Administer 1 Drop to both eyes daily. * timolol 0.5 % ophthalmic gel-forming Commonly known as:  TIMOPTIC-XE  
  
 triamterene-hydroCHLOROthiazide 37.5-25 mg per tablet Commonly known as:  Alex Min TAKE 1 TABLET BY MOUTH DAILY * Notice: This list has 2 medication(s) that are the same as other medications prescribed for you. Read the directions carefully, and ask your doctor or other care provider to review them with you. Introducing [facility]!    
 [facility] introduces "Mobile Location, IP" patient portal. Now you can access parts of your medical record, email your doctor's office, and request medication refills online. 1. In your internet browser, go to [email or web address] 2. Click on the First Time User? Click Here link in the Sign In box. You will see the New Member Sign Up page. 3. Enter your Rocketmiles Access Code exactly as it appears below. You will not need to use this code after youve completed the sign-up process. If you do not sign up before the expiration date, you must request a new code. · R[employer]miles Access Code: [email or web address] Expires: 10/22/2017 10:08 AM 
 
4. Enter the last four digits of your Social Security Number (xxxx) and Date of Birth (mm/dd/yyyy) as indicated and click Submit. You will be taken to the next sign-up page. 5. Create a Rocketmiles ID. This will be your Rocketmiles login ID and cannot be changed, so think of one that is secure and easy to remember. 6. Create a R[employer]miles password. You can change your password at any time. 7. Enter your Password Reset Question and Answer. This can be used at a later time if you forget your password. 8. Enter your e-mail address. You will receive e-mail notification when new information is available in [address] 9. Click Sign Up. You can now view and download portions of your medical record. 10. Click the Download Summary menu link to download a portable copy of your medical information. If you have questions, please visit the Frequently Asked Questions section of the Rocketmiles website. Remember, Rocketmiles is NOT to be used for urgent needs. For medical emergencies, dial 911. Now available from your iPhone and Android! Please provide this summary of care documentation to your next provider. Your primary care clinician is listed as Marty Youssef. If you have any questions after today's visit, please call [phone].

## 2017-08-04 NOTE — PROGRESS NOTES
HPI/History  Mak Kim is a 80 y.o.  male who presents for med clearance. Pt accompanied by wife. Pt scheduled to have removal of scalp lesions with Dr. Robbie Montile on 8/7. Anticipated grafting. No preop ppwk available. Pt has underwent similar in past without complication. HTN treated as noted below. BP acceptably controlled. No cardiopulmonary sxs. HLD not being treated with statin. Usually takes 81mg ASA but has been holding as instructed for upcoming procedure. Parkinson dz followed by Dr. Annabelle Manning. Meds below. Reportedly stable. BPH and hx of retention. Followed by Dr. Qamar Bartlett. Meds below. Reportedly stable. Otherwise, pt reports he is doing well with no other sxs/complaints. 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    Allergic rhinitis due to allergen J30.9    Essential hypertension with goal blood pressure less than 140/90 I10    Benign prostatic hyperplasia with lower urinary tract symptoms N40.1    Urinary retention R33.9    Parkinson disease ([address] G20     Past Medical History:   Diagnosis Date    Allergic rhinitis     BPH (benign prostatic hyperplasia)     Calculus of kidney     Contact dermatitis and other eczema, due to unspecified cause     skin    Essential tremor     Hypertension     Parkinson's disease ([address]      Past Surgical History:   Procedure Laterality Date    HX CARPAL TUNNEL RELEASE       Social History     Social History    Marital status:      Spouse name: N/A    Number of children: N/A    Years of education: N/A     Occupational History    Not on file.      Social History Main Topics    Smoking status: Never Smoker    Smokeless tobacco: Never Used    Alcohol use No    Drug use: No    Sexual activity: Not on file     Other Topics Concern    Not on file     Social History Narrative     Family History   Problem Relation Age of Onset    Heart Disease Neg Hx      Current Outpatient Prescriptions   Medication Sig    carbidopa-levodopa (RYTARY) 36. mg cpER Take 1 Cap by mouth three (3) times daily. Indications: Parkinsonism    triamterene-hydroCHLOROthiazide (MAXZIDE) 37.5-25 mg per tablet TAKE 1 TABLET BY MOUTH DAILY    amLODIPine (NORVASC) 2.5 mg tablet Take 1 Tab by mouth daily.  finasteride (PROSCAR) 5 mg tablet TAKE ONE TABLET BY MOUTH EVERY DAY    terazosin (HYTRIN) 10 mg capsule Take 1 Cap by mouth nightly.  AZOPT 1 % ophthalmic suspension     timolol (TIMOPTIC-XE) 0.5 % ophthalmic gel-forming     fluticasone (FLONASE) 50 mcg/actuation nasal spray 2 sprays each nostril daily.  timolol maleate 0.5 % drpd ophthalmic solution Administer 1 Drop to both eyes daily.  montelukast (SINGULAIR) 10 mg tablet Take 1 Tab by mouth daily.  aspirin delayed-release 81 mg tablet Take  by mouth daily (Already holding for procedure)      No current facility-administered medications for this visit. Allergies   Allergen Reactions    Pcn [Penicillins] Nausea and Vomiting    Sulfur Diarrhea       Review of Systems  Aside from those included in HPI, remainder of complete ROS negative. Physical Examination  Visit Vitals    /74 (BP 1 Location: Right arm, BP Patient Position: Sitting)    Pulse 91    Temp 97.7 °F (36.5 °C) (Oral)    Resp 14    Ht 5' 5\" (1.651 m)    Wt 128 lb 3.2 oz (58.2 kg)    SpO2 97%    BMI 21.33 kg/m2       General - Alert and in no acute distress. Elderly pt who appears acutely well. He appears comfortable and in good spirits. Pleasant, engaging. Nontoxic. Not anxious, non-diaphoretic. Slightly flat affect likely due to PD. Also with tremor attributed to same.   Mental status - Appropriate mood, behavior, speech content, dress, and thought processes. Eyes - Pupils equal and reactive, extraocular movements intact. No erythema or discharge. Ears - Right canal with excess cerumen obstructing view of TM but reportedly asymptomatic. Left canal and TM unremarkable. Nose - No erythema. No rhinorrhea. Mouth - Mucous membranes moist. Pharynx without lesions, swelling, erythema, or exudate. Neck - Supple without rigidity. Lymph - No periauricular, perimandibular, or cervical tenderness or swelling. Pulm - No tachypnea, retractions, or cyanosis. Good respiratory effort. Clear to auscultation bilat. Cardiovascular - Normal rate, regular rhythm. Abdomen - Active bowel sounds. Soft, nontender. No guarding, rigidity, or rebound. No appreciable masses. Skin - Multiple lesions of head/scalp which look to range from AKs to SKs to other. Assessment and Plan  1. HTN - Appears acceptably controlled. Continue current and monitor. 2. HLD - TLC and monitor. 3. PD - reportedly stable. F/u Dr. Lorie Bailon. 4. BPH with hx of retention - Reportedly stable. F/u Dr. Lu Forrest. 5. Med clearance for plastic surgery removal of head/scalp lesions - Pt already holding ASA as instructed and will resume as directed. Otherwise, pt appears medically stable to undergo procedure as planned. Pt and wife happily agree with plan. PLEASE NOTE:   This document has been produced using voice recognition software. Unrecognized errors in transcription may be present.     Rosi Garcia [employer] of [address] [place] [address]  [phone]  8/4/2017

## 2017-08-07 ENCOUNTER — HOSPITAL ENCOUNTER (OUTPATIENT)
Dept: LAB | Age: 82
Discharge: HOME | End: 2017-08-07
Payer: MEDICARE

## 2017-08-07 PROCEDURE — 88331 PATH CONSLTJ SURG 1 BLK 1SPC: CPT

## 2017-08-07 PROCEDURE — 88305 TISSUE EXAM BY PATHOLOGIST: CPT

## 2017-09-06 ENCOUNTER — LAB ONLY (OUTPATIENT)
Dept: INTERNAL MEDICINE CLINIC | Age: 82
End: 2017-09-06

## 2017-09-06 ENCOUNTER — HOSPITAL ENCOUNTER (OUTPATIENT)
Dept: LAB | Age: 82
Discharge: HOME | End: 2017-09-06
Payer: MEDICARE

## 2017-09-06 DIAGNOSIS — I10 ESSENTIAL HYPERTENSION: ICD-10-CM

## 2017-09-06 DIAGNOSIS — I10 ESSENTIAL HYPERTENSION: Primary | ICD-10-CM

## 2017-09-06 DIAGNOSIS — R63.4 WEIGHT LOSS: ICD-10-CM

## 2017-09-06 LAB
ALBUMIN SERPL-MCNC: 4.3 G/DL (ref 3.4–5)
ALBUMIN/GLOB SERPL: 1.7 {RATIO} (ref 0.8–1.7)
ALP SERPL-CCNC: 80 U/L (ref 45–117)
ALT SERPL-CCNC: 26 U/L (ref 16–61)
ANION GAP SERPL CALC-SCNC: 9 MMOL/L (ref 3–18)
AST SERPL-CCNC: 18 U/L (ref 15–37)
BASOPHILS # BLD: 0 K/UL (ref 0–0.06)
BASOPHILS NFR BLD: 0 % (ref 0–2)
BILIRUB SERPL-MCNC: 0.7 MG/DL (ref 0.2–1)
BUN SERPL-MCNC: 16 MG/DL (ref 7–18)
BUN/CREAT SERPL: 17 (ref 12–20)
CALCIUM SERPL-MCNC: 9.6 MG/DL (ref 8.5–10.1)
CHLORIDE SERPL-SCNC: 105 MMOL/L (ref 100–108)
CHOLEST SERPL-MCNC: 196 MG/DL
CO2 SERPL-SCNC: 29 MMOL/L (ref 21–32)
CREAT SERPL-MCNC: 0.95 MG/DL (ref 0.6–1.3)
DIFFERENTIAL METHOD BLD: NORMAL
EOSINOPHIL # BLD: 0 K/UL (ref 0–0.4)
EOSINOPHIL NFR BLD: 0 % (ref 0–5)
ERYTHROCYTE [DISTWIDTH] IN BLOOD BY AUTOMATED COUNT: 13.2 % (ref 11.6–14.5)
GLOBULIN SER CALC-MCNC: 2.6 G/DL (ref 2–4)
GLUCOSE SERPL-MCNC: 97 MG/DL (ref 74–99)
HCT VFR BLD AUTO: 46 % (ref 36–48)
HDLC SERPL-MCNC: 91 MG/DL (ref 40–60)
HDLC SERPL: 2.2 {RATIO} (ref 0–5)
HGB BLD-MCNC: 15.5 G/DL (ref 13–16)
LDLC SERPL CALC-MCNC: 86.6 MG/DL (ref 0–100)
LIPID PROFILE,FLP: ABNORMAL
LYMPHOCYTES # BLD: 1.9 K/UL (ref 0.9–3.6)
LYMPHOCYTES NFR BLD: 38 % (ref 21–52)
MCH RBC QN AUTO: 31.4 PG (ref 24–34)
MCHC RBC AUTO-ENTMCNC: 33.7 G/DL (ref 31–37)
MCV RBC AUTO: 93.3 FL (ref 74–97)
MONOCYTES # BLD: 0.2 K/UL (ref 0.05–1.2)
MONOCYTES NFR BLD: 5 % (ref 3–10)
NEUTS SEG # BLD: 2.8 K/UL (ref 1.8–8)
NEUTS SEG NFR BLD: 57 % (ref 40–73)
PLATELET # BLD AUTO: 160 K/UL (ref 135–420)
PMV BLD AUTO: 10.4 FL (ref 9.2–11.8)
POTASSIUM SERPL-SCNC: 3.8 MMOL/L (ref 3.5–5.5)
PROT SERPL-MCNC: 6.9 G/DL (ref 6.4–8.2)
RBC # BLD AUTO: 4.93 M/UL (ref 4.7–5.5)
SODIUM SERPL-SCNC: 143 MMOL/L (ref 136–145)
T4 FREE SERPL-MCNC: 1.4 NG/DL (ref 0.7–1.5)
TRIGL SERPL-MCNC: 92 MG/DL (ref ?–150)
TSH SERPL DL<=0.05 MIU/L-ACNC: 1.06 UIU/ML (ref 0.36–3.74)
VLDLC SERPL CALC-MCNC: 18.4 MG/DL
WBC # BLD AUTO: 4.9 K/UL (ref 4.6–13.2)

## 2017-09-06 PROCEDURE — 36415 COLL VENOUS BLD VENIPUNCTURE: CPT | Performed by: INTERNAL MEDICINE

## 2017-09-06 PROCEDURE — 84439 ASSAY OF FREE THYROXINE: CPT | Performed by: INTERNAL MEDICINE

## 2017-09-06 PROCEDURE — 80061 LIPID PANEL: CPT | Performed by: INTERNAL MEDICINE

## 2017-09-06 PROCEDURE — 85025 COMPLETE CBC W/AUTO DIFF WBC: CPT | Performed by: INTERNAL MEDICINE

## 2017-09-06 PROCEDURE — 80053 COMPREHEN METABOLIC PANEL: CPT | Performed by: INTERNAL MEDICINE

## 2017-09-06 PROCEDURE — 84443 ASSAY THYROID STIM HORMONE: CPT | Performed by: INTERNAL MEDICINE

## 2017-09-08 ENCOUNTER — OFFICE VISIT (OUTPATIENT)
Dept: INTERNAL MEDICINE CLINIC | Age: 82
End: 2017-09-08

## 2017-09-08 VITALS
RESPIRATION RATE: 12 BRPM | OXYGEN SATURATION: 99 % | TEMPERATURE: 98.4 F | HEIGHT: 65 IN | DIASTOLIC BLOOD PRESSURE: 74 MMHG | HEART RATE: 73 BPM | SYSTOLIC BLOOD PRESSURE: 134 MMHG | BODY MASS INDEX: 20.49 KG/M2 | WEIGHT: 123 LBS

## 2017-09-08 DIAGNOSIS — G25.0 ESSENTIAL TREMOR: ICD-10-CM

## 2017-09-08 DIAGNOSIS — Z23 ENCOUNTER FOR IMMUNIZATION: ICD-10-CM

## 2017-09-08 DIAGNOSIS — I10 ESSENTIAL HYPERTENSION WITH GOAL BLOOD PRESSURE LESS THAN 140/90: Primary | ICD-10-CM

## 2017-09-08 DIAGNOSIS — R63.4 WEIGHT LOSS: ICD-10-CM

## 2017-09-08 DIAGNOSIS — G20 PARKINSON'S DISEASE (HCC): ICD-10-CM

## 2017-09-08 NOTE — MR AVS SNAPSHOT
Visit Information Date & Time Provider Department Dept. Phone Encounter #  
 9/8/2017 10:30 AM Deonte Hi MD Internist of [address] [id number] Follow-up Instructions Return in about 6 months (around 3/8/2018). Upcoming Health Maintenance Date Due DTaP/Tdap/Td series (1 - Tdap) 3/30/1950 MEDICARE YEARLY EXAM 3/30/1994 Pneumococcal 65+ Low/Medium Risk (2 of 2 - PPSV23) 8/15/2017 GLAUCOMA SCREENING Q2Y 9/8/2019 Allergies as of 9/8/2017  Review Complete On: 9/8/2017 By: Deonte Hi MD  
  
 Severity Noted Reaction Type Reactions Pcn [Penicillins]  02/16/2012    Nausea and Vomiting Sulfur  02/22/2016    Diarrhea Current Immunizations  Reviewed on 9/8/2017 Name Date Influenza High Dose Vaccine PF  Incomplete, 8/26/2016, 10/9/2014 11:45 AM  
 Influenza Vaccine 1/10/2014 Influenza Vaccine (Quad) PF 2/22/2016 Pneumococcal Polysaccharide (PPSV-23) 8/15/2012 ZZZ-RETIRED (DO NOT USE) Pneumococcal Vaccine (Unspecified Type) 8/15/2012 Reviewed by Deonte Hi MD on 9/8/2017 at 10:53 AM  
You Were Diagnosed With   
  
 Codes Comments Essential hypertension with goal blood pressure less than 140/90    -  Primary ICD-10-CM: I10 
ICD-9-CM: 401.9 Encounter for immunization     ICD-10-CM: T37 ICD-9-CM: V03.89 [facility]'s disease [facility]     ICD-10-CM: G20 
ICD-9-CM: 332.0 Essential tremor     ICD-10-CM: G25.0 ICD-9-CM: 333.1 Weight loss     ICD-10-CM: R63.4 ICD-9-CM: 783.21 Vitals BP Pulse Temp Resp Height(growth percentile) Weight(growth percentile) 134/74 (BP 1 Location: Left arm, BP Patient Position: Sitting) 73 98.4 °F (36.9 °C) (Oral) 12 5' 5\" (1.651 m) 123 lb (55.8 kg) SpO2 BMI Smoking Status 99% 20.47 kg/m2 Never Smoker Vitals History BMI and BSA Data Body Mass Index Body Surface Area  
 20.47 kg/m 2 1.6 m 2 Preferred Pharmacy Pharmacy Name Phone [facility] Your Updated Medication List  
  
   
This list is accurate as of: 9/8/17 10:56 AM.  Always use your most recent med list. amLODIPine 2.5 mg tablet Commonly known as:  [place] Take 1 Tab by mouth daily. AZOPT 1 % ophthalmic suspension Generic drug:  brinzolamide  
  
 carbidopa-levodopa ER 36. mg per capsule Commonly known as:  RYTARY Take 1 Cap by mouth three (3) times daily. Indications: Parkinsonism * timolol maleate 0.5 % Drpd ophthalmic solution Administer 1 Drop to both eyes daily. * timolol 0.5 % ophthalmic gel-forming Commonly known as:  TIMOPTIC-XE * Notice: This list has 2 medication(s) that are the same as other medications prescribed for you. Read the directions carefully, and ask your doctor or other care provider to review them with you. We Performed the Following INFLUENZA VIRUS VACCINE, HIGH DOSE SEASONAL, PRESERVATIVE FREE [76332 CPT(R)] Follow-up Instructions Return in about 6 months (around 3/8/2018). Introducing [facility] & HEALTH SERVICES! Tess Anthony introduces [employer] [facility] patient portal. Now you can access parts of your medical record, email your doctor's office, and request medication refills online. 1. In your internet browser, go to [email or web address] 2. Click on the First Time User? Click Here link in the Sign In box. You will see the New Member Sign Up page. 3. Enter your [employer] Access Code exactly as it appears below. You will not need to use this code after youve completed the sign-up process. If you do not sign up before the expiration date, you must request a new code. · [employer] [place] Access Code: [email or web address] Expires: 10/22/2017 10:08 AM 
 
4.  Enter the last four digits of your Social Security Number (xxxx) and Date of Birth (mm/dd/yyyy) as indicated and click Submit. You will be taken to the next sign-up page. 5. Create a [employer] ID. This will be your [employer] login ID and cannot be changed, so think of one that is secure and easy to remember. 6. Create a [employer] password. You can change your password at any time. 7. Enter your Password Reset Question and Answer. This can be used at a later time if you forget your password. 8. Enter your e-mail address. You will receive e-mail notification when new information is available in [address] 9. Click Sign Up. You can now view and download portions of your medical record. 10. Click the Download Summary menu link to download a portable copy of your medical information. If you have questions, please visit the Frequently Asked Questions section of the [employer] website. Remember, [employer] is NOT to be used for urgent needs. For medical emergencies, dial 911. Now available from your iPhone and Android! Please provide this summary of care documentation to your next provider. Your primary care clinician is listed as Robert Carty. Yovana Dye. If you have any questions after today's visit, please call [phone].

## 2017-09-08 NOTE — PROGRESS NOTES
1. Have you been to the ER, urgent care clinic or hospitalized since your last visit? NO.     2. Have you seen or consulted any other health care providers outside of the [address] [place] [address] since your last visit (Include any pap smears or colon screening)? NO      Do you have an Advanced Directive? YES Patient's wife will bring in soon. Health Maintenance Due   Topic Date Due    DTaP/Tdap/Td series (1 - Tdap) 03/30/1950    MEDICARE YEARLY EXAM  03/30/1994    GLAUCOMA SCREENING Q2Y  02/10/2017    INFLUENZA AGE 9 TO ADULT  08/01/2017    Pneumococcal 65+ Low/Medium Risk (2 of 2 - PPSV23) 08/15/2017       Patient given influenza vaccine, Fluzone, in left deltoid and tolerated well without adverse reactions or complications. Patient given vaccine information statement handout.

## 2017-09-08 NOTE — PROGRESS NOTES
Robi Pritchard 3/30/1929, is a 80 y.o. male, who is seen today for reevaluation of hypertension Parkinson's disease weight loss glaucoma. He feels pretty well and has had one fall since his last visit where he struck the right side of his head but that was a few months ago when he is doing well since then. He is trying to be careful not to fall. He has cut out a lot of the vitamins and minerals he was taking any no longer uses a diuretic for blood pressure control. He uses his eyedrops and amlodipine and Parkinson's medication and he follows up with his neurologist regularly. He is here with a female  today. She says he is finally eating a lot better more pie and ice cream and other calories, he has lost 16 pounds from year ago. His appetite is good. Past Medical History:   Diagnosis Date    Allergic rhinitis     BPH (benign prostatic hyperplasia)     Calculus of kidney     Contact dermatitis and other eczema, due to unspecified cause     skin    Essential tremor     Hypertension     Parkinson's disease ([address]      Past Surgical History:   Procedure Laterality Date    HX CARPAL TUNNEL RELEASE       Current Outpatient Prescriptions   Medication Sig Dispense Refill    carbidopa-levodopa (RYTARY) 36. mg cpER Take 1 Cap by mouth three (3) times daily. Indications: Parkinsonism 90 Cap 2    amLODIPine (NORVASC) 2.5 mg tablet Take 1 Tab by mouth daily. 90 Tab 3    AZOPT 1 % ophthalmic suspension       timolol (TIMOPTIC-XE) 0.5 % ophthalmic gel-forming       timolol maleate 0.5 % drpd ophthalmic solution Administer 1 Drop to both eyes daily.        Allergies   Allergen Reactions    Pcn [Penicillins] Nausea and Vomiting    Sulfur Diarrhea     Social History     Social History    Marital status:      Spouse name: N/A    Number of children: N/A    Years of education: N/A     Social History Main Topics    Smoking status: Never Smoker    Smokeless tobacco: Never Used    Alcohol use No    Drug use: No    Sexual activity: Not Asked     Other Topics Concern    None     Social History Narrative     Visit Vitals    /74 (BP 1 Location: Left arm, BP Patient Position: Sitting)    Pulse 73    Temp 98.4 °F (36.9 °C) (Oral)    Resp 12    Ht 5' 5\" (1.651 m)    Wt 123 lb (55.8 kg)    SpO2 99%    BMI 20.47 kg/m2     Ear canal on the left appears normal with good light reflex from the tympanic membrane. On the right there is about 80% occlusion with dry wax and I cannot see the tympanic membrane. Oral cavity reveals no lesions. Neck reveals no adenopathy or thyromegaly. Carotids are 2+ without bruits. Lungs are clear to percussion. Good breath sounds with no wheezing or crackles. Heart reveals regular rhythm with normal S1 and S2 no murmur gallop click or rub. Apical impulse is not palpable. Abdomen is soft and nontender with no hepatosplenomegaly or masses and no bruits. Extremities reveal no clubbing cyanosis or edema. Pulses are 2+. Results for orders placed or performed during the hospital encounter of 09/06/17   LIPID PANEL   Result Value Ref Range    LIPID PROFILE          Cholesterol, total 196 <200 MG/DL    Triglyceride 92 <150 MG/DL    HDL Cholesterol 91 (H) 40 - 60 MG/DL    LDL, calculated 86.6 0 - 100 MG/DL    VLDL, calculated 18.4 MG/DL    CHOL/HDL Ratio 2.2 0 - 5.0     CBC WITH AUTOMATED DIFF   Result Value Ref Range    WBC 4.9 4.6 - 13.2 K/uL    RBC 4.93 4.70 - 5.50 M/uL    HGB 15.5 13.0 - 16.0 g/dL    HCT 46.0 36.0 - 48.0 %    MCV 93.3 74.0 - 97.0 FL    MCH 31.4 24.0 - 34.0 PG    MCHC 33.7 31.0 - 37.0 g/dL    RDW 13.2 11.6 - 14.5 %    PLATELET 881 257 - 740 K/uL    MPV 10.4 9.2 - 11.8 FL    NEUTROPHILS 57 40 - 73 %    LYMPHOCYTES 38 21 - 52 %    MONOCYTES 5 3 - 10 %    EOSINOPHILS 0 0 - 5 %    BASOPHILS 0 0 - 2 %    ABS. NEUTROPHILS 2.8 1.8 - 8.0 K/UL    ABS. LYMPHOCYTES 1.9 0.9 - 3.6 K/UL    ABS. MONOCYTES 0.2 0.05 - 1.2 K/UL    ABS.  EOSINOPHILS 0.0 0.0 - 0.4 K/UL    ABS. BASOPHILS 0.0 0.0 - 0.06 K/UL    DF AUTOMATED     METABOLIC PANEL, COMPREHENSIVE   Result Value Ref Range    Sodium 143 136 - 145 mmol/L    Potassium 3.8 3.5 - 5.5 mmol/L    Chloride 105 100 - 108 mmol/L    CO2 29 21 - 32 mmol/L    Anion gap 9 3.0 - 18 mmol/L    Glucose 97 74 - 99 mg/dL    BUN 16 7.0 - 18 MG/DL    Creatinine 0.95 0.6 - 1.3 MG/DL    BUN/Creatinine ratio 17 12 - 20      GFR est AA >60 >60 ml/min/1.73m2    GFR est non-AA >60 >60 ml/min/1.73m2    Calcium 9.6 8.5 - 10.1 MG/DL    Bilirubin, total 0.7 0.2 - 1.0 MG/DL    ALT (SGPT) 26 16 - 61 U/L    AST (SGOT) 18 15 - 37 U/L    Alk. phosphatase 80 45 - 117 U/L    Protein, total 6.9 6.4 - 8.2 g/dL    Albumin 4.3 3.4 - 5.0 g/dL    Globulin 2.6 2.0 - 4.0 g/dL    A-G Ratio 1.7 0.8 - 1.7     T4, FREE   Result Value Ref Range    T4, Free 1.4 0.7 - 1.5 NG/DL   TSH 3RD GENERATION   Result Value Ref Range    TSH 1.06 0.36 - 3.74 uIU/mL     Assessment: #1. Hypertension is well controlled, for now he will continue amlodipine 2.5 mg daily for the blood pressure goes any lower I can probably have him stop that medication. #2.  Weight loss of 16 pounds of last year but finally eating better. His  is encouraging to eat more including high calorie foods and I certainly agree with that. He would like to stabilize his weight or having gained a few pounds in the coming months. #3.  Parkinson's disease seems to be stable. Only mild tremor and he is getting around quite well. He will continue current medication for Parkinson's as above. #4.  Glaucoma followed regularly by Dr. Kathrine Thomas. Follow-up in 6 months or sooner if needed. He will get a flu shot now. Horacio Rodriguez MD FACP    Please note: This document has been produced using voice recognition software. Unrecognized errors in transcription may be present.

## 2017-10-11 ENCOUNTER — OFFICE VISIT (OUTPATIENT)
Dept: NEUROLOGY | Age: 82
End: 2017-10-11

## 2017-10-11 VITALS
WEIGHT: 120 LBS | HEIGHT: 65 IN | TEMPERATURE: 97.8 F | RESPIRATION RATE: 12 BRPM | OXYGEN SATURATION: 98 % | SYSTOLIC BLOOD PRESSURE: 138 MMHG | HEART RATE: 84 BPM | DIASTOLIC BLOOD PRESSURE: 74 MMHG | BODY MASS INDEX: 19.99 KG/M2

## 2017-10-11 DIAGNOSIS — G25.0 BENIGN FAMILIAL TREMOR: ICD-10-CM

## 2017-10-11 DIAGNOSIS — G20 PARKINSON'S DISEASE (HCC): Primary | ICD-10-CM

## 2017-10-11 DIAGNOSIS — G47.52 REM SLEEP BEHAVIOR DISORDER: ICD-10-CM

## 2017-10-11 DIAGNOSIS — G25.0 ESSENTIAL TREMOR: ICD-10-CM

## 2017-10-11 RX ORDER — CARBIDOPA AND LEVODOPA 25; 100 MG/1; MG/1
1 TABLET ORAL 3 TIMES DAILY
COMMUNITY
End: 2017-12-27 | Stop reason: ALTCHOICE

## 2017-10-11 NOTE — MR AVS SNAPSHOT
Visit Information Date & Time Provider Department Dept. Phone Encounter #  
 10/11/2017  9:45 AM Hussain Lundberg MD [address] at [address] [facility] [id number] Follow-up Instructions Return in about 1 month (around 11/11/2017). Your Appointments 11/15/2017  1:45 PM  
Follow Up with Hussain Lundberg MD  
[address] at [address] [facility]) Appt Note: 1 mo f/u  
 [address] Critical access hospital [address] [facility] [address] [facility] [address] [address] [address] [address]  
  
    
 3/9/2018 11:00 AM  
Office Visit with Danny Marcus MD  
[facility]) Appt Note: 6 month f/u  
 [address] [facility], [address] [address] [address] [facility]  
  
   
 [address], [place] Upcoming Health Maintenance Date Due DTaP/Tdap/Td series (1 - Tdap) 3/30/1950 MEDICARE YEARLY EXAM 3/30/1994 Pneumococcal 65+ Low/Medium Risk (2 of 2 - PPSV23) 8/15/2017 GLAUCOMA SCREENING Q2Y 9/8/2019 Allergies as of 10/11/2017  Review Complete On: 10/11/2017 By: Hussain Lundberg MD  
  
 Severity Noted Reaction Type Reactions Pcn [Penicillins]  02/16/2012    Nausea and Vomiting Sulfur  02/22/2016    Diarrhea Current Immunizations  Reviewed on 9/8/2017 Name Date Influenza High Dose Vaccine PF 9/8/2017 10:53 AM, 8/26/2016, 10/9/2014 11:45 AM  
 Influenza Vaccine 1/10/2014 Influenza Vaccine (Quad) PF 2/22/2016 Pneumococcal Polysaccharide (PPSV-23) 8/15/2012 ZZZ-RETIRED (DO NOT USE) Pneumococcal Vaccine (Unspecified Type) 8/15/2012 Not reviewed this visit You Were Diagnosed With   
  
 Codes Comments Parkinson's disease ([facility]    -  Primary ICD-10-CM: G20 
ICD-9-CM: 332.0 Essential tremor     ICD-10-CM: G25.0 ICD-9-CM: 333.1  REM sleep behavior disorder     ICD-10-CM: G47.52 
ICD-9-CM: 327.42   
 Benign familial tremor     ICD-10-CM: G25.0 ICD-9-CM: 333.1 Vitals BP Pulse Temp Resp Height(growth percentile) Weight(growth percentile) 138/74 (BP 1 Location: Right arm, BP Patient Position: Sitting) 84 97.8 °F (36.6 °C) (Oral) 12 5' 5\" (1.651 m) 120 lb (54.4 kg) SpO2 BMI Smoking Status 98% 19.97 kg/m2 Never Smoker Vitals History BMI and BSA Data Body Mass Index Body Surface Area  
 19.97 kg/m 2 1.58 m 2 Preferred Pharmacy Pharmacy Name Phone [facility] Your Updated Medication List  
  
   
This list is accurate as of: 10/11/17 10:28 AM.  Always use your most recent med list. amLODIPine 2.5 mg tablet Commonly known as:  [place] Take 1 Tab by mouth daily. AZOPT 1 % ophthalmic suspension Generic drug:  brinzolamide * SINEMET  mg per tablet Generic drug:  carbidopa-levodopa Take 1 Tab by mouth three (3) times daily. * carbidopa-levodopa ER 36. mg per capsule Commonly known as:  RYTARY Take 1 Cap by mouth three (3) times daily. Indications: Parkinsonism * timolol maleate 0.5 % Drpd ophthalmic solution Administer 1 Drop to both eyes daily. * timolol 0.5 % ophthalmic gel-forming Commonly known as:  TIMOPTIC-XE * Notice: This list has 4 medication(s) that are the same as other medications prescribed for you. Read the directions carefully, and ask your doctor or other care provider to review them with you. Follow-up Instructions Return in about 1 month (around 11/11/2017). Introducing hospitals & HEALTH SERVICES! Romayne Duster introduces [employer] patient portal. Now you can access parts of your medical record, email your doctor's office, and request medication refills online. 1. In your internet browser, go to [email or web address] 2. Click on the First Time User? Click Here link in the Sign In box. You will see the New Member Sign Up page. 3. Enter your [employer] Access Code exactly as it appears below. You will not need to use this code after youve completed the sign-up process. If you do not sign up before the expiration date, you must request a new code. · [employer] Access Code: [email or web address] Expires: 10/22/2017 10:08 AM 
 
4. Enter the last four digits of your Social Security Number (xxxx) and Date of Birth (mm/dd/yyyy) as indicated and click Submit. You will be taken to the next sign-up page. 5. Create a [employer] ID. This will be your [employer] login ID and cannot be changed, so think of one that is secure and easy to remember. 6. Create a [employer] password. You can change your password at any time. 7. Enter your Password Reset Question and Answer. This can be used at a later time if you forget your password. 8. Enter your e-mail address. You will receive e-mail notification when new information is available in [address] 9. Click Sign Up. You can now view and download portions of your medical record. 10. Click the Download Summary menu link to download a portable copy of your medical information. If you have questions, please visit the Frequently Asked Questions section of the [employer] website. Remember, [employer] is NOT to be used for urgent needs. For medical emergencies, dial 911. Now available from your iPhone and Android! Please provide this summary of care documentation to your next provider. Your primary care clinician is listed as Gabriel Nelson. If you have any questions after today's visit, please call [phone].

## 2017-10-16 ENCOUNTER — TELEPHONE (OUTPATIENT)
Dept: NEUROLOGY | Age: 82
End: 2017-10-16

## 2017-10-16 RX ORDER — AMLODIPINE BESYLATE 2.5 MG/1
TABLET ORAL
Qty: 90 TAB | Refills: 0 | Status: SHIPPED | OUTPATIENT
Start: 2017-10-16 | End: 2018-01-17 | Stop reason: SDUPTHER

## 2017-10-23 ENCOUNTER — TELEPHONE (OUTPATIENT)
Dept: NEUROLOGY | Age: 82
End: 2017-10-23

## 2017-10-23 NOTE — TELEPHONE ENCOUNTER
Received therapeutic alert on patient's carbidopa-levodopa. Place in Dr. Luke Rosenbaum folder for review.

## 2017-12-27 ENCOUNTER — OFFICE VISIT (OUTPATIENT)
Dept: INTERNAL MEDICINE CLINIC | Age: 82
End: 2017-12-27

## 2017-12-27 ENCOUNTER — HOSPITAL ENCOUNTER (OUTPATIENT)
Dept: LAB | Age: 82
Discharge: HOME | End: 2017-12-27
Payer: MEDICARE

## 2017-12-27 VITALS
HEIGHT: 65 IN | OXYGEN SATURATION: 97 % | TEMPERATURE: 98.3 F | DIASTOLIC BLOOD PRESSURE: 72 MMHG | WEIGHT: 119 LBS | HEART RATE: 78 BPM | BODY MASS INDEX: 19.83 KG/M2 | RESPIRATION RATE: 14 BRPM | SYSTOLIC BLOOD PRESSURE: 138 MMHG

## 2017-12-27 DIAGNOSIS — G20 PARKINSON'S DISEASE (HCC): ICD-10-CM

## 2017-12-27 DIAGNOSIS — R63.4 WEIGHT LOSS: Primary | ICD-10-CM

## 2017-12-27 DIAGNOSIS — I10 ESSENTIAL HYPERTENSION WITH GOAL BLOOD PRESSURE LESS THAN 140/90: ICD-10-CM

## 2017-12-27 DIAGNOSIS — R63.0 ANOREXIA: ICD-10-CM

## 2017-12-27 LAB
ALBUMIN SERPL-MCNC: 4.1 G/DL (ref 3.4–5)
ALBUMIN/GLOB SERPL: 1.4 {RATIO} (ref 0.8–1.7)
ALP SERPL-CCNC: 102 U/L (ref 45–117)
ALT SERPL-CCNC: 25 U/L (ref 16–61)
ANION GAP SERPL CALC-SCNC: 10 MMOL/L (ref 3–18)
AST SERPL-CCNC: 20 U/L (ref 15–37)
BASOPHILS # BLD: 0 K/UL (ref 0–0.06)
BASOPHILS NFR BLD: 0 % (ref 0–2)
BILIRUB SERPL-MCNC: 0.4 MG/DL (ref 0.2–1)
BUN SERPL-MCNC: 20 MG/DL (ref 7–18)
BUN/CREAT SERPL: 21 (ref 12–20)
CALCIUM SERPL-MCNC: 9.5 MG/DL (ref 8.5–10.1)
CHLORIDE SERPL-SCNC: 107 MMOL/L (ref 100–108)
CO2 SERPL-SCNC: 29 MMOL/L (ref 21–32)
CREAT SERPL-MCNC: 0.97 MG/DL (ref 0.6–1.3)
DIFFERENTIAL METHOD BLD: NORMAL
EOSINOPHIL # BLD: 0.1 K/UL (ref 0–0.4)
EOSINOPHIL NFR BLD: 1 % (ref 0–5)
ERYTHROCYTE [DISTWIDTH] IN BLOOD BY AUTOMATED COUNT: 13 % (ref 11.6–14.5)
GLOBULIN SER CALC-MCNC: 3 G/DL (ref 2–4)
GLUCOSE SERPL-MCNC: 122 MG/DL (ref 74–99)
HCT VFR BLD AUTO: 44 % (ref 36–48)
HGB BLD-MCNC: 14.4 G/DL (ref 13–16)
LYMPHOCYTES # BLD: 1.6 K/UL (ref 0.9–3.6)
LYMPHOCYTES NFR BLD: 34 % (ref 21–52)
MCH RBC QN AUTO: 30.3 PG (ref 24–34)
MCHC RBC AUTO-ENTMCNC: 32.7 G/DL (ref 31–37)
MCV RBC AUTO: 92.4 FL (ref 74–97)
MONOCYTES # BLD: 0.4 K/UL (ref 0.05–1.2)
MONOCYTES NFR BLD: 8 % (ref 3–10)
NEUTS SEG # BLD: 2.7 K/UL (ref 1.8–8)
NEUTS SEG NFR BLD: 57 % (ref 40–73)
PLATELET # BLD AUTO: 165 K/UL (ref 135–420)
PMV BLD AUTO: 10.8 FL (ref 9.2–11.8)
POTASSIUM SERPL-SCNC: 4.1 MMOL/L (ref 3.5–5.5)
PROT SERPL-MCNC: 7.1 G/DL (ref 6.4–8.2)
RBC # BLD AUTO: 4.76 M/UL (ref 4.7–5.5)
SODIUM SERPL-SCNC: 146 MMOL/L (ref 136–145)
T4 FREE SERPL-MCNC: 1.3 NG/DL (ref 0.7–1.5)
TSH SERPL DL<=0.05 MIU/L-ACNC: 1 UIU/ML (ref 0.36–3.74)
WBC # BLD AUTO: 4.8 K/UL (ref 4.6–13.2)

## 2017-12-27 PROCEDURE — 85025 COMPLETE CBC W/AUTO DIFF WBC: CPT | Performed by: INTERNAL MEDICINE

## 2017-12-27 PROCEDURE — 80053 COMPREHEN METABOLIC PANEL: CPT | Performed by: INTERNAL MEDICINE

## 2017-12-27 PROCEDURE — 84443 ASSAY THYROID STIM HORMONE: CPT | Performed by: INTERNAL MEDICINE

## 2017-12-27 PROCEDURE — 84439 ASSAY OF FREE THYROXINE: CPT | Performed by: INTERNAL MEDICINE

## 2017-12-27 NOTE — PROGRESS NOTES
1. Have you been to the ER, urgent care clinic or hospitalized since your last visit? NO.     2. Have you seen or consulted any other health care providers outside of the [address] [place] [address] since your last visit (Include any pap smears or colon screening)? NO      Do you have an Advanced Directive?  YES

## 2017-12-27 NOTE — MR AVS SNAPSHOT
Visit Information Date & Time Provider Department Dept. Phone Encounter #  
 12/27/2017  2:30 PM Evaristo Milian MD [facility] [id number] Your Appointments 1/26/2018  2:45 PM  
Follow Up with Edinson Benoit MD  
[facility] at [address] [facility] [address]) Appt Note: 1 mo f/u; rs'd from 11/15/2017 provider out of office [address] [facility] [address] [facility] [address] [facility]  [facility] [address] [address] [address] [address]  
  
    
 3/9/2018 11:00 AM  
Office Visit with Evaristo Milian MD  
[facility] [address]) Appt Note: 6 month f/u  
 [address] [facility], [address] [address] [address] [place]  
  
   
 [address], [address] Upcoming Health Maintenance Date Due DTaP/Tdap/Td series (1 - Tdap) 3/30/1950 MEDICARE YEARLY EXAM 3/30/1994 Pneumococcal 65+ Low/Medium Risk (2 of 2 - PPSV23) 8/15/2017 GLAUCOMA SCREENING Q2Y 9/8/2019 Allergies as of 12/27/2017  Review Complete On: 12/27/2017 By: Evaristo Milian MD  
  
 Severity Noted Reaction Type Reactions Pcn [Penicillins]  02/16/2012    Nausea and Vomiting Sulfur  02/22/2016    Diarrhea Current Immunizations  Reviewed on 9/8/2017 Name Date Influenza High Dose Vaccine PF 9/8/2017 10:53 AM, 8/26/2016, 10/9/2014 11:45 AM  
 Influenza Vaccine 1/10/2014 Influenza Vaccine (Quad) PF 2/22/2016 Pneumococcal Polysaccharide (PPSV-23) 8/15/2012 ZZZ-RETIRED (DO NOT USE) Pneumococcal Vaccine (Unspecified Type) 8/15/2012 Not reviewed this visit You Were Diagnosed With   
  
 Codes Comments Weight loss    -  Primary ICD-10-CM: R63.4 ICD-9-CM: 783.21 Essential hypertension with goal blood pressure less than 140/90     ICD-10-CM: I10 
ICD-9-CM: 401.9 Parkinson's disease ([facility] [address]     ICD-10-CM: G20 
ICD-9-CM: 332.0 Vitals BP Pulse Temp Resp Height(growth percentile) Weight(growth percentile) 138/72 78 98.3 °F (36.8 °C) (Oral) 14 5' 5\" (1.651 m) 119 lb (54 kg) SpO2 BMI Smoking Status 97% 19.8 kg/m2 Never Smoker Vitals History BMI and BSA Data Body Mass Index Body Surface Area  
 19.8 kg/m 2 1.57 m 2 Preferred Pharm[facility] Pharm[facility] Your Updated Medication List  
  
   
This list is accurate as of: 12/27/17  3:42 PM.  Always use your most recent med list. amLODIPine 2.5 mg tablet Commonly known as:  Wandra Yamileth TAKE ONE TABLET BY MOUTH DAILY AZOPT 1 % ophthalmic suspension Generic drug:  brinzolamide  
  
 carbidopa-levodopa ER 36. mg per capsule Commonly known as:  RYTARY Take 1 Cap by mouth three (3) times daily. Indications: Parkinsonism * timolol maleate 0.5 % Drpd ophthalmic solution Administer 1 Drop to both eyes daily. * timolol 0.5 % ophthalmic gel-forming Commonly known as:  TIMOPTIC-XE * Notice: This list has 2 medication(s) that are the same as other medications prescribed for you. Read the directions carefully, and ask your doctor or other care provider to review them with you. To-Do List   
 Around 12/27/2017 Lab:  CBC WITH AUTOMATED DIFF Around 12/27/2017 Lab:  METABOLIC PANEL, COMPREHENSIVE Around 12/27/2017 Lab:  T4, FREE Around 12/27/2017 Lab:  TSH 3RD GENERATION Introducing [facility] [place] & [facility]! Kurtis Lisa introduces [employer] patient portal. Now you can access parts of your medical record, email your doctor's office, and request medication refills online. 1. In your internet browser, go to [email or web address] Systems 2. Click on the First Time User? Click Here link in the Sign In box. You will see the New Member Sign Up page. 3. Enter your IAMINTOIT Access Code exactly as it appears below. You will not need to use this code after youve completed the sign-up process. If you do not sign up before the expiration date, you must request a new code. · IAMINTOIT Access Code: [email or web address] Expires: 3/27/2018  2:15 PM 
 
4. Enter the last four digits of your Social Security Number (xxxx) and Date of Birth (mm/dd/yyyy) as indicated and click Submit. You will be taken to the next sign-up page. 5. Create a IAMINTOIT ID. This will be your IAMINTOIT login ID and cannot be changed, so think of one that is secure and easy to remember. 6. Create a IAMINTOIT password. You can change your password at any time. 7. Enter your Password Reset Question and Answer. This can be used at a later time if you forget your password. 8. Enter your e-mail address. You will receive e-mail notification when new information is available in [address] 9. Click Sign Up. You can now view and download portions of your medical record. 10. Click the Download Summary menu link to download a portable copy of your medical information. If you have questions, please visit the Frequently Asked Questions section of the IAMINTOIT website. Remember, IAMINTOIT is NOT to be used for urgent needs. For medical emergencies, dial 911. Now available from your iPhone and Android! Please provide this summary of care documentation to your next provider. Your primary care clinician is listed as Gisell Medina. Astrid Shanks. If you have any questions after today's visit, please call [phone].

## 2017-12-27 NOTE — PROGRESS NOTES
Lavaun Olszewski 3/30/1929, is a 80 y.o. male, who is seen today for weight loss primarily. He states that for quite some time he has simply had no appetite but has no nausea or vomiting or abdominal pain with eating. His bowels move regularly. When he starts to eat he just has no appetite for eating anymore. No particular foods InterStim. He thinks it might be the medication he is on for Parkinson's disease. When he last saw his neurologist she told him simply to stop the levodopa/carbidopa. He was certainly not interested in just stopping and the relative with him today states that she simply did not do that. He and she are both dissatisfied with the neurologist for that decision and just the visits there in general.  He would like to see a different neurologist.  He tries to tell me his history but his voice is shaky he does not seem to recall a lot of details but the relative with him this feeling details. In reviewing his chart he has lost 1 pound in 10 weeks but 14 over the last year. He has hypertension which has been well-controlled with low-dose amlodipine. Past Medical History:   Diagnosis Date    Allergic rhinitis     BPH (benign prostatic hyperplasia)     Calculus of kidney     Contact dermatitis and other eczema, due to unspecified cause     skin    Essential tremor     Hypertension     Parkinson's disease (HCC)      Current Outpatient Prescriptions   Medication Sig Dispense Refill    amLODIPine (NORVASC) 2.5 mg tablet TAKE ONE TABLET BY MOUTH DAILY 90 Tab 0    carbidopa-levodopa (RYTARY) 36. mg cpER Take 1 Cap by mouth three (3) times daily. Indications: Parkinsonism 90 Cap 2    AZOPT 1 % ophthalmic suspension       timolol (TIMOPTIC-XE) 0.5 % ophthalmic gel-forming       timolol maleate 0.5 % drpd ophthalmic solution Administer 1 Drop to both eyes daily.        Visit Vitals    /72    Pulse 78    Temp 98.3 °F (36.8 °C) (Oral)    Resp 14    Ht 5' 5\" (1.651 m)  Wt 119 lb (54 kg)    SpO2 97%    BMI 19.8 kg/m2     Carotids are 2+ without bruits. Lungs are clear to percussion. Good breath sounds with no wheezing or crackles. Heart reveals a regular rhythm with normal S1 and S2 no murmur gallop click or rub. Abdomen is not distended and not tympanitic. Normoactive bowel sounds. No hepatosplenomegaly or masses and no bruits. No rebound tenderness. Extremities reveal no clubbing cyanosis or edema. Assessment: Weight loss related to very poor appetite. This could be from the medicine he uses for Parkinson's or the Parkinson's itself. No other specific disease process seems to be present. There is no evidence of cancer or other chronic disease. He had quite a bit of lab done in September which was normal including kidney function liver enzymes blood count and thyroid levels. Nonetheless, I will repeat those studies and if still normal as expected I will refer him to a gastroenterologist.  Also, he has Parkinson's and is not happy with his neurologist, I talked to him at length about that and I will refer him to Dr. Wright Lefort. I explained to him this is an excellent neurologist who usually will spend the time necessary to make an accurate diagnosis and reasonable recommendations. #2. Hypertension is well controlled. He will continue amlodipine 2.5 mg daily, that medication is not causing side effects and is not related to anorexia or weight loss. This visit lasted 25 minutes, greater than 50% of the time spent counseling on the issues regarding poor appetite weight loss his current medications and arranging for consultation with Dr. Sharon Montgomery and with Dr. Wright Lefort    Follow-up here as previously scheduled, or sooner if needed. Horacio Bobby MD [facility]    Please note: This document has been produced using voice recognition software. Unrecognized errors in transcription may be present.

## 2017-12-28 NOTE — PROGRESS NOTES
Please notify the patient that his blood count and all of his chemistries and thyroid levels look good. I have made referrals to the neurologist that we spoke about as well as Dr. Lula Cook.

## 2017-12-29 ENCOUNTER — TELEPHONE (OUTPATIENT)
Dept: INTERNAL MEDICINE CLINIC | Age: 82
End: 2017-12-29

## 2017-12-29 ENCOUNTER — DOCUMENTATION ONLY (OUTPATIENT)
Dept: INTERNAL MEDICINE CLINIC | Age: 82
End: 2017-12-29

## 2017-12-29 NOTE — PROGRESS NOTES
Notes Recorded by Manoj Salas MD on 12/28/2017 at 7:48 AM  Please notify the patient that his blood count and all of his chemistries and thyroid levels look good.  I have made referrals to the neurologist that we spoke about as well as Dr. Mayo Hopkins. Pt wife given the information. I also gave her the name of the doctors for referrals.

## 2017-12-29 NOTE — TELEPHONE ENCOUNTER
----- Message from Mabel Ellsworth MD sent at 12/28/2017  7:48 AM EST -----  Please notify the patient that his blood count and all of his chemistries and thyroid levels look good. I have made referrals to the neurologist that we spoke about as well as Dr. Jude De La Cruz.

## 2018-01-01 ENCOUNTER — OFFICE VISIT (OUTPATIENT)
Dept: INTERNAL MEDICINE CLINIC | Age: 83
End: 2018-01-01

## 2018-01-01 ENCOUNTER — HOSPITAL ENCOUNTER (OUTPATIENT)
Dept: LAB | Age: 83
Discharge: HOME | End: 2018-11-09
Payer: MEDICARE

## 2018-01-01 ENCOUNTER — TELEPHONE (OUTPATIENT)
Dept: INTERNAL MEDICINE CLINIC | Age: 83
End: 2018-01-01

## 2018-01-01 ENCOUNTER — HOSPITAL ENCOUNTER (OUTPATIENT)
Dept: MRI IMAGING | Age: 83
Discharge: HOME | End: 2018-03-05
Attending: PSYCHIATRY & NEUROLOGY
Payer: MEDICARE

## 2018-01-01 ENCOUNTER — HOSPITAL ENCOUNTER (OUTPATIENT)
Dept: LAB | Age: 83
Discharge: HOME | End: 2018-08-22
Payer: MEDICARE

## 2018-01-01 ENCOUNTER — HOSPITAL ENCOUNTER (OUTPATIENT)
Dept: LAB | Age: 83
Discharge: HOME | End: 2018-05-01
Payer: MEDICARE

## 2018-01-01 VITALS
SYSTOLIC BLOOD PRESSURE: 126 MMHG | BODY MASS INDEX: 18.86 KG/M2 | HEIGHT: 65 IN | HEART RATE: 90 BPM | OXYGEN SATURATION: 98 % | WEIGHT: 113.2 LBS | RESPIRATION RATE: 12 BRPM | TEMPERATURE: 98.8 F | DIASTOLIC BLOOD PRESSURE: 75 MMHG

## 2018-01-01 VITALS
WEIGHT: 106.8 LBS | RESPIRATION RATE: 14 BRPM | BODY MASS INDEX: 17.79 KG/M2 | TEMPERATURE: 98.8 F | HEIGHT: 65 IN | HEART RATE: 84 BPM | SYSTOLIC BLOOD PRESSURE: 138 MMHG | DIASTOLIC BLOOD PRESSURE: 88 MMHG | OXYGEN SATURATION: 97 %

## 2018-01-01 VITALS
OXYGEN SATURATION: 99 % | WEIGHT: 107.2 LBS | TEMPERATURE: 98.4 F | DIASTOLIC BLOOD PRESSURE: 70 MMHG | BODY MASS INDEX: 17.86 KG/M2 | RESPIRATION RATE: 14 BRPM | HEIGHT: 65 IN | HEART RATE: 78 BPM | SYSTOLIC BLOOD PRESSURE: 112 MMHG

## 2018-01-01 DIAGNOSIS — G20 PARKINSON'S DISEASE (HCC): ICD-10-CM

## 2018-01-01 DIAGNOSIS — I10 ESSENTIAL HYPERTENSION WITH GOAL BLOOD PRESSURE LESS THAN 140/90: ICD-10-CM

## 2018-01-01 DIAGNOSIS — Z01.812 BLOOD TESTS PRIOR TO TREATMENT OR PROCEDURE: ICD-10-CM

## 2018-01-01 DIAGNOSIS — Z01.818 PREOP EXAM FOR INTERNAL MEDICINE: Primary | ICD-10-CM

## 2018-01-01 DIAGNOSIS — R31.0 HEMATURIA, GROSS: Primary | ICD-10-CM

## 2018-01-01 DIAGNOSIS — R31.9 HEMATURIA, UNSPECIFIED TYPE: ICD-10-CM

## 2018-01-01 DIAGNOSIS — R29.898 WEAKNESS OF BOTH LEGS: ICD-10-CM

## 2018-01-01 DIAGNOSIS — R31.9 HEMATURIA, UNSPECIFIED TYPE: Primary | ICD-10-CM

## 2018-01-01 DIAGNOSIS — R63.4 WEIGHT LOSS: ICD-10-CM

## 2018-01-01 LAB
ALBUMIN SERPL-MCNC: 4 G/DL (ref 3.4–5)
ALBUMIN SERPL-MCNC: 4.4 G/DL (ref 3.4–5)
ALBUMIN/GLOB SERPL: 1.5 {RATIO} (ref 0.8–1.7)
ALBUMIN/GLOB SERPL: 1.7 {RATIO} (ref 0.8–1.7)
ALP SERPL-CCNC: 77 U/L (ref 45–117)
ALP SERPL-CCNC: 87 U/L (ref 45–117)
ALT SERPL-CCNC: 24 U/L (ref 16–61)
ALT SERPL-CCNC: 25 U/L (ref 16–61)
ANION GAP SERPL CALC-SCNC: 6 MMOL/L (ref 3–18)
ANION GAP SERPL CALC-SCNC: 7 MMOL/L (ref 3–18)
AST SERPL-CCNC: 17 U/L (ref 15–37)
AST SERPL-CCNC: 19 U/L (ref 15–37)
ATRIAL RATE: 92 BPM
BACTERIA SPEC CULT: NORMAL
BASOPHILS # BLD: 0 K/UL (ref 0–0.1)
BASOPHILS NFR BLD: 1 % (ref 0–2)
BILIRUB SERPL-MCNC: 0.6 MG/DL (ref 0.2–1)
BILIRUB SERPL-MCNC: 0.6 MG/DL (ref 0.2–1)
BILIRUB UR QL STRIP: NORMAL
BUN SERPL-MCNC: 16 MG/DL (ref 7–18)
BUN SERPL-MCNC: 19 MG/DL (ref 7–18)
BUN/CREAT SERPL: 17 (ref 12–20)
BUN/CREAT SERPL: 19 (ref 12–20)
CALCIUM SERPL-MCNC: 9.3 MG/DL (ref 8.5–10.1)
CALCIUM SERPL-MCNC: 9.5 MG/DL (ref 8.5–10.1)
CALCULATED P AXIS, ECG09: 60 DEGREES
CALCULATED R AXIS, ECG10: 29 DEGREES
CALCULATED T AXIS, ECG11: 47 DEGREES
CHLORIDE SERPL-SCNC: 106 MMOL/L (ref 100–108)
CHLORIDE SERPL-SCNC: 108 MMOL/L (ref 100–108)
CO2 SERPL-SCNC: 28 MMOL/L (ref 21–32)
CO2 SERPL-SCNC: 31 MMOL/L (ref 21–32)
CREAT SERPL-MCNC: 0.92 MG/DL (ref 0.6–1.3)
CREAT SERPL-MCNC: 0.98 MG/DL (ref 0.6–1.3)
DIAGNOSIS, 93000: NORMAL
DIFFERENTIAL METHOD BLD: ABNORMAL
EOSINOPHIL # BLD: 0 K/UL (ref 0–0.4)
EOSINOPHIL NFR BLD: 1 % (ref 0–5)
ERYTHROCYTE [DISTWIDTH] IN BLOOD BY AUTOMATED COUNT: 12.8 % (ref 11.6–14.5)
ERYTHROCYTE [DISTWIDTH] IN BLOOD BY AUTOMATED COUNT: 13.1 % (ref 11.6–14.5)
GLOBULIN SER CALC-MCNC: 2.6 G/DL (ref 2–4)
GLOBULIN SER CALC-MCNC: 2.7 G/DL (ref 2–4)
GLUCOSE SERPL-MCNC: 101 MG/DL (ref 74–99)
GLUCOSE SERPL-MCNC: 105 MG/DL (ref 74–99)
GLUCOSE UR-MCNC: NEGATIVE MG/DL
HCT VFR BLD AUTO: 42.5 % (ref 36–48)
HCT VFR BLD AUTO: 43.6 % (ref 36–48)
HGB BLD-MCNC: 14.1 G/DL (ref 13–16)
HGB BLD-MCNC: 14.9 G/DL (ref 13–16)
KETONES P FAST UR STRIP-MCNC: NEGATIVE MG/DL
LYMPHOCYTES # BLD: 2.1 K/UL (ref 0.9–3.6)
LYMPHOCYTES NFR BLD: 44 % (ref 21–52)
MCH RBC QN AUTO: 30.7 PG (ref 24–34)
MCH RBC QN AUTO: 31 PG (ref 24–34)
MCHC RBC AUTO-ENTMCNC: 33.2 G/DL (ref 31–37)
MCHC RBC AUTO-ENTMCNC: 34.2 G/DL (ref 31–37)
MCV RBC AUTO: 90.6 FL (ref 74–97)
MCV RBC AUTO: 92.6 FL (ref 74–97)
MONOCYTES # BLD: 0.2 K/UL (ref 0.05–1.2)
MONOCYTES NFR BLD: 5 % (ref 3–10)
NEUTS SEG # BLD: 2.3 K/UL (ref 1.8–8)
NEUTS SEG NFR BLD: 49 % (ref 40–73)
P-R INTERVAL, ECG05: 196 MS
PH UR STRIP: 6 [PH] (ref 4.6–8)
PLATELET # BLD AUTO: 153 K/UL (ref 135–420)
PLATELET # BLD AUTO: 157 K/UL (ref 135–420)
PMV BLD AUTO: 10.4 FL (ref 9.2–11.8)
PMV BLD AUTO: 10.5 FL (ref 9.2–11.8)
POTASSIUM SERPL-SCNC: 4 MMOL/L (ref 3.5–5.5)
POTASSIUM SERPL-SCNC: 4 MMOL/L (ref 3.5–5.5)
PROT SERPL-MCNC: 6.7 G/DL (ref 6.4–8.2)
PROT SERPL-MCNC: 7 G/DL (ref 6.4–8.2)
PROT UR QL STRIP: NORMAL
Q-T INTERVAL, ECG07: 330 MS
QRS DURATION, ECG06: 82 MS
QTC CALCULATION (BEZET), ECG08: 408 MS
RBC # BLD AUTO: 4.59 M/UL (ref 4.7–5.5)
RBC # BLD AUTO: 4.81 M/UL (ref 4.7–5.5)
SERVICE CMNT-IMP: NORMAL
SODIUM SERPL-SCNC: 141 MMOL/L (ref 136–145)
SODIUM SERPL-SCNC: 145 MMOL/L (ref 136–145)
SP GR UR STRIP: 1.02 (ref 1–1.03)
UA UROBILINOGEN AMB POC: NORMAL (ref 0.2–1)
URINALYSIS CLARITY POC: NORMAL
URINALYSIS COLOR POC: NORMAL
URINE BLOOD POC: NORMAL
URINE LEUKOCYTES POC: NEGATIVE
URINE NITRITES POC: NEGATIVE
VENTRICULAR RATE, ECG03: 92 BPM
WBC # BLD AUTO: 4.7 K/UL (ref 4.6–13.2)
WBC # BLD AUTO: 5.4 K/UL (ref 4.6–13.2)

## 2018-01-01 PROCEDURE — 87086 URINE CULTURE/COLONY COUNT: CPT

## 2018-01-01 PROCEDURE — 80053 COMPREHEN METABOLIC PANEL: CPT | Performed by: INTERNAL MEDICINE

## 2018-01-01 PROCEDURE — 85025 COMPLETE CBC W/AUTO DIFF WBC: CPT | Performed by: INTERNAL MEDICINE

## 2018-01-01 PROCEDURE — 80053 COMPREHEN METABOLIC PANEL: CPT

## 2018-01-01 PROCEDURE — 93005 ELECTROCARDIOGRAM TRACING: CPT

## 2018-01-01 PROCEDURE — 70551 MRI BRAIN STEM W/O DYE: CPT

## 2018-01-01 PROCEDURE — 85027 COMPLETE CBC AUTOMATED: CPT

## 2018-01-01 PROCEDURE — 36415 COLL VENOUS BLD VENIPUNCTURE: CPT

## 2018-01-01 RX ORDER — AMLODIPINE BESYLATE 2.5 MG/1
TABLET ORAL
Qty: 90 TAB | Refills: 0 | Status: SHIPPED | OUTPATIENT
Start: 2018-01-01 | End: 2018-01-01 | Stop reason: ALTCHOICE

## 2018-01-01 RX ORDER — AMLODIPINE BESYLATE 2.5 MG/1
TABLET ORAL
Qty: 90 TAB | Refills: 0 | Status: SHIPPED | OUTPATIENT
Start: 2018-01-01 | End: 2018-01-01 | Stop reason: SDUPTHER

## 2018-01-01 RX ORDER — MEMANTINE HYDROCHLORIDE 10 MG/1
10 TABLET ORAL
COMMUNITY
Start: 2018-01-01 | End: 2018-01-01 | Stop reason: ALTCHOICE

## 2018-01-01 RX ORDER — CARBIDOPA AND LEVODOPA 25; 100 MG/1; MG/1
TABLET ORAL
COMMUNITY
Start: 2018-01-01 | End: 2018-01-01 | Stop reason: SDUPTHER

## 2018-01-02 NOTE — TELEPHONE ENCOUNTER
Wife advised. She refused names and numbers of the specialists. Says she will wait for them to call her.

## 2018-01-17 RX ORDER — AMLODIPINE BESYLATE 2.5 MG/1
TABLET ORAL
Qty: 90 TAB | Refills: 0 | Status: SHIPPED | OUTPATIENT
Start: 2018-01-17 | End: 2018-01-01 | Stop reason: SDUPTHER

## 2018-05-14 NOTE — MR AVS SNAPSHOT
[address] [address] [address], [facility] [address] [facility] 
[phone] Patient: Arlene Mcguire MRN: [id number] [id number] Visit Information Date & Time Provider Department Dept. Phone Encounter #  
 5/14/2018  1:30 PM Malachi Mejia MD [facility] [address] [place] [address] [phone] [id number] Your Appointments 8/14/2018 11:00 AM  
Office Visit with Malachi Mejia MD  
[facility] [address] [place] [address] [address] [facility]) Appt Note: 3 month f/u  
 [address] [address] [address] [facility] Upcoming Health Maintenance Date Due DTaP/Tdap/Td series (1 - Tdap) 3/30/1950 Pneumococcal 65+ Low/Medium Risk (2 of 2 - PPSV23) 8/15/2017 MEDICARE YEARLY EXAM 3/14/2018 Influenza Age 5 to Adult 8/1/2018 GLAUCOMA SCREENING Q2Y 9/8/2019 Allergies as of 5/14/2018  Review Complete On: 5/14/2018 By: Malachi Mejia MD  
  
 Severity Noted Reaction Type Reactions Pcn [Penicillins]  02/16/2012    Nausea and Vomiting Sulfur  02/22/2016    Diarrhea Current Immunizations  Reviewed on 9/8/2017 Name Date Influenza High Dose Vaccine PF 9/8/2017 10:53 AM, 8/26/2016, 10/9/2014 11:45 AM  
 Influenza Vaccine 1/10/2014 Influenza Vaccine (Quad) PF 2/22/2016 Pneumococcal Polysaccharide (PPSV-23) 8/15/2012 ZZZ-RETIRED (DO NOT USE) Pneumococcal Vaccine (Unspecified Type) 8/15/2012 Not reviewed this visit You Were Diagnosed With   
  
 Codes Comments Preop exam for internal medicine    -  Primary ICD-10-CM: A02.703 ICD-9-CM: V72.83 Weakness of both legs     ICD-10-CM: R29.898 ICD-9-CM: 729.89 Parkinson's disease ([facility] [address]     ICD-10-CM: G20 
ICD-9-CM: 332.0 Essential hypertension with goal blood pressure less than 140/90     ICD-10-CM: I10 
ICD-9-CM: 401.9 Vitals BP Pulse Temp Resp Height(growth percentile) Weight(growth percentile) 126/75 (BP 1 Location: Left arm, BP Patient Position: Sitting) 90 98.8 °F (37.1 °C) (Oral) 12 5' 5\" (1.651 m) 113 lb 3.2 oz (51.3 kg) SpO2 BMI Smoking Status 98% 18.84 kg/m2 Never Smoker BMI and BSA Data Body Mass Index Body Surface Area  
 18.84 kg/m 2 1.53 m 2 Preferred Pharmacy Pharmacy Name [facility]ens[facility] [address] [place] [address] Your Updated Medication List  
  
   
This list is accurate as of 5/14/18  2:10 PM.  Always use your most recent med list. amLODIPine 2.5 mg tablet Commonly known as:  Patt Friendbe TAKE ONE TABLET BY MOUTH DAILY AZOPT 1 % ophthalmic suspension Generic drug:  brinzolamide  
  
 carbidopa-levodopa ER 36. mg per capsule Commonly known as:  RYTARY Take 1 Cap by mouth three (3) times daily. Indications: Parkinsonism  
  
 timolol 0.5 % ophthalmic gel-forming Commonly known as:  TIMOPTIC-XE Patient Instructions Patient's wife states the patient has a Medical Directive and will bring a copy to put in the patient's chart. Health Maintenance Due Topic Date Due  
 DTaP/Tdap/Td series (1 - Tdap) 03/30/1950  Pneumococcal 65+ Low/Medium Risk (2 of 2 - PPSV23) 08/15/2017  MEDICARE YEARLY EXAM  03/14/2018 Introducing [facility] & HEALTH SERVICES! Dea Bajwa introduces [employer] patient portal. Now you can access parts of your medical record, email your doctor's office, and request medication refills online. 1. In your internet browser, go to [email or web address] 2. Click on the First Time User? Click Here link in the Sign In box. You will see the New Member Sign Up page. 3. Enter your [employer]ero Access Code exactly as it appears below. You will not need to use this code after youve completed the sign-up process.  If you do not sign up before the expiration date, you must request a new code. · [employer] Access Code: [email or web address] Expires: 7/30/2018 10:59 AM 
 
4. Enter the last four digits of your Social Security Number (xxxx) and Date of Birth (mm/dd/yyyy) as indicated and click Submit. You will be taken to the next sign-up page. 5. Create a Duriana ID. This will be your Duriana login ID and cannot be changed, so think of one that is secure and easy to remember. 6. Create a Duriana password. You can change your password at any time. 7. Enter your Password Reset Question and Answer. This can be used at a later time if you forget your password. 8. Enter your e-mail address. You will receive e-mail notification when new information is available in [address] 9. Click Sign Up. You can now view and download portions of your medical record. 10. Click the Download Summary menu link to download a portable copy of your medical information. If you have questions, please visit the Frequently Asked Questions section of the Duriana website. Remember, Duriana is NOT to be used for urgent needs. For medical emergencies, dial 911. Now available from your iPhone and Android! Please provide this summary of care documentation to your next provider. Your primary care clinician is listed as Robert Carty. Yovana Dye. If you have any questions after today's visit, please call [phone].

## 2018-05-14 NOTE — ACP (ADVANCE CARE PLANNING)
Patient's wife states the patient has a Medical Directive and will bring a copy to put in the patient's chart.

## 2018-05-14 NOTE — PROGRESS NOTES
Anson Mitchell 3/30/1929, is a 80 y.o. male, who is seen today for   Preoperative evaluation prior to having several skin cancers removed under general anesthesia. He is feeling pretty well otherwise but not eating well. Just does not have much appetite though he is offered plenty of food. He has Parkinson's disease and is gradually gotten weaker and would like to try some home physical therapy. He has mild hypertension which is well controlled. Past Medical History:   Diagnosis Date    Allergic rhinitis     BPH (benign prostatic hyperplasia)     Calculus of kidney     Contact dermatitis and other eczema, due to unspecified cause     skin    Essential tremor     Hypertension     Parkinson's disease ([facility] [address]      Past Surgical History:   Procedure Laterality Date    HX CARPAL TUNNEL RELEASE       Current Outpatient Prescriptions   Medication Sig Dispense Refill    amLODIPine (NORVASC) 2.5 mg tablet TAKE ONE TABLET BY MOUTH DAILY 90 Tab 0    carbidopa-levodopa (RYTARY) 36. mg cpER Take 1 Cap by mouth three (3) times daily. Indications: Parkinsonism 90 Cap 2    AZOPT 1 % ophthalmic suspension       timolol (TIMOPTIC-XE) 0.5 % ophthalmic gel-forming        Allergies   Allergen Reactions    Pcn [Penicillins] Nausea and Vomiting    Sulfur Diarrhea     Social History     Social History    Marital status:      Spouse name: N/A    Number of children: N/A    Years of education: N/A     Social History Main Topics    Smoking status: Never Smoker    Smokeless tobacco: Never Used    Alcohol use No    Drug use: No    Sexual activity: Not Asked     Other Topics Concern    None     Social History Narrative     Review of systems: He denies exertional discomfort in the chest neck jaw back or arm. He denies dyspnea on exertion PND orthopnea or edema. He denies syncope or near syncope.     Visit Vitals    /75 (BP 1 Location: Left arm, BP Patient Position: Sitting)    Pulse 90    Temp 98.8 °F (37.1 °C) (Oral)    Resp 12    Ht 5' 5\" (1.651 m)    Wt 113 lb 3.2 oz (51.3 kg)    SpO2 98%    BMI 18.84 kg/m2     Carotids are 2+ without bruits. No adenopathy or thyromegaly. Lungs are clear to percussion. Good breath sounds with no wheezing or crackles. Heart reveals a regular rhythm with normal S1 and S2 no murmur gallop click or rub. Apical impulse is not palpable. Abdomen is soft and nontender with no hepatosplenomegaly or masses and no bruits. Extremities reveal no clubbing cyanosis or edema. Pulses are intact throughout. He is a growth on the top of his head and behind his left ear. Assessment: #1. Skin cancers, low risk for upcoming surgery. He is medically cleared for proposed surgical procedures and general anesthetic. #2. Hypertension well controlled. He will continue amlodipine 2.5 mg daily. #3.  Parkinson's disease gradually getting weaker and not eating as well. Will follow up with his neurologist and continue current medication. We are going to get him some home physical therapy as well. Horacio Shaikh MD FACP    Please note: This document has been produced using voice recognition software. Unrecognized errors in transcription may be present.

## 2018-05-14 NOTE — PROGRESS NOTES
Chief Complaint   Patient presents with    Pre-op Exam     Room 9 upcoming Plastic Surgery with Dr Cathlean Lesch on 5-21-18 to remove Cancerous areas on the Head Left Side behind Left Ear and on the Left hand      Patient's wife states the patient has a Medical Directive and will bring a copy to put in the patient's chart. 1. Have you been to the ER, urgent care clinic since your last visit? Hospitalized since your last visit? No    2. Have you seen or consulted any other health care providers outside of the [facility] since your last visit? Include any pap smears or colon screening.  No

## 2018-07-20 NOTE — TELEPHONE ENCOUNTER
Called to talk to Mrs. Bran Masterson about her  not feeling well this morning. She says, she was worried because he was unable to get out of bed this morning. Patient told her he was too weak to get up, but didn't complain of any particular pain . The wife gave him cereal and a donut with water and she says he perked up. She took his blood pressure and the blood pressure was normal. So they decided he was fine and didn't need to go to the ER. Mrs. Bran Masterson was advised to keep and eye on him and if any signs or symptoms occur out of the ordinary, she is to report to the ER for further evaluation.

## 2018-07-20 NOTE — TELEPHONE ENCOUNTER
Mrs. Chance Mckeon cancelled appt for today stating he is too weak to get out of bed. She wanted to know what to do. I told her if he is that weak then he should probably go to the ER. Please advise.

## 2018-08-22 NOTE — PROGRESS NOTES
Lindsay Stallings 3/30/1929, is a 80 y.o. male, who is seen today for preoperative evaluation prior to having multiple squamous cell skin cancers removed in the near future. He has had multiple growths removed about 3 months ago and has several more that need to come off under anesthetic. He still is not eating well and has lost 6 more pounds in the last 3 and half months, he drinks very little Ensure anymore eats very little just does not have an appetite but has no abdominal pain nausea or vomiting. He is doing a little better with activity with physical therapy but his last physical therapy as this week. He has had no falls. No lightheadedness. Past Medical History:   Diagnosis Date    Allergic rhinitis     BPH (benign prostatic hyperplasia)     Calculus of kidney     Contact dermatitis and other eczema, due to unspecified cause     skin    Essential tremor     Hypertension     Parkinson's disease ([facility] [address]      Past Surgical History:   Procedure Laterality Date    HX CARPAL TUNNEL RELEASE       Current Outpatient Prescriptions   Medication Sig Dispense Refill    amLODIPine (NORVASC) 2.5 mg tablet TAKE ONE TABLET BY MOUTH DAILY 90 Tab 0    carbidopa-levodopa (RYTARY) 36. mg cpER Take 1 Cap by mouth three (3) times daily. Indications: Parkinsonism 90 Cap 2    AZOPT 1 % ophthalmic suspension       timolol (TIMOPTIC-XE) 0.5 % ophthalmic gel-forming        He apparently is taking his carbidopa only once a day.     Allergies   Allergen Reactions    Pcn [Penicillins] Nausea and Vomiting    Sulfur Diarrhea     Social History     Social History    Marital status:      Spouse name: N/A    Number of children: N/A    Years of education: N/A     Social History Main Topics    Smoking status: Never Smoker    Smokeless tobacco: Never Used    Alcohol use No    Drug use: No    Sexual activity: Not Asked     Other Topics Concern    None     Social History Narrative     Review of systems: He denies exertional discomfort in the chest neck jaw back or arm. He denies dyspnea on exertion PND orthopnea or edema. No syncope or near syncope. Visit Vitals    /70 (BP 1 Location: Left arm, BP Patient Position: Sitting)    Pulse 78    Temp 98.4 °F (36.9 °C) (Oral)    Resp 14    Ht 5' 5\" (1.651 m)    Wt 107 lb 3.2 oz (48.6 kg)    SpO2 99%    BMI 17.84 kg/m2     Carotids are 2+ without bruits. Lungs are clear to percussion. Good breath sounds with no wheezing or crackles. Heart reveals a regular rhythm with normal S1 and S2 no murmur gallop click or rub. Abdomen is soft and nontender with no hepatosplenomegaly or masses and no bruits. Extremities reveal no clubbing cyanosis or edema. Pulses are 2+. Mild cogwheeling and mild rigidity. CBC and CMP are drawn today and will be available by tomorrow. EKG 3 months ago was normal.  This will be included with the fax to Dr. Fernanda Zimmer. Assessment: #1.  Multiple skin cancers, low risk for upcoming surgery under anesthetic. He is medically cleared for surgery. #2.  Parkinson's doing fairly well but should take his medicine 3 times a day. #3.  Continued weakness and weight loss, encouraged him to try to eat more frequent small meals with higher calorie concentration including milkshake mixed with ensure etc. with no other specific GI symptoms I do not think further GI evaluation is indicated. #4.  History of hypertension with blood pressure lower after all his weight loss. He will discontinue amlodipine at this point. Follow-up here in 3 months or sooner if needed    Horacio Rudd MD FACP    Please note: This document has been produced using voice recognition software. Unrecognized errors in transcription may be present.

## 2018-08-22 NOTE — PROGRESS NOTES
1. Have you been to the ER, urgent care clinic or hospitalized since your last visit? NO.     2. Have you seen or consulted any other health care providers outside of the [address] [place] [address] since your last visit (Include any pap smears or colon screening)? NO      Do you have an Advanced Directive? YES    Would you like information on Advanced Directives?  NO

## 2018-08-22 NOTE — MR AVS SNAPSHOT
[address] [facility] 
 
 
 [address] 
[phone] Patient: Yesenia Cui MRN: [id number] FUL:[id number] Visit Information Date & Time Provider Department Dept. Phone Encounter #  
 8/22/2018  1:30 PM Yash Milian MD [facility] [address] [place] [address] Road [phone] [id number] Follow-up Instructions Return in about 3 months (around 11/22/2018). Follow-up and Disposition History Upcoming Health Maintenance Date Due DTaP/Tdap/Td series (1 - Tdap) 3/30/1950 Pneumococcal 65+ Low/Medium Risk (2 of 2 - PPSV23) 8/15/2017 MEDICARE YEARLY EXAM 3/14/2018 Influenza Age 5 to Adult 8/1/2018 GLAUCOMA SCREENING Q2Y 9/8/2019 Allergies as of 8/22/2018  Review Complete On: 8/22/2018 By: Yash Milian MD  
  
 Severity Noted Reaction Type Reactions Pcn [Penicillins]  02/16/2012    Nausea and Vomiting Sulfur  02/22/2016    Diarrhea Current Immunizations  Reviewed on 9/8/2017 Name Date Influenza High Dose Vaccine PF 9/8/2017 10:53 AM, 8/26/2016, 10/9/2014 11:45 AM  
 Influenza Vaccine 1/10/2014 Influenza Vaccine (Quad) PF 2/22/2016 Pneumococcal Polysaccharide (PPSV-23) 8/15/2012 ZZZ-RETIRED (DO NOT USE) Pneumococcal Vaccine (Unspecified Type) 8/15/2012 Not reviewed this visit You Were Diagnosed With   
  
 Codes Comments Preop exam for internal medicine    -  Primary ICD-10-CM: V86.844 ICD-9-CM: V72.83 Parkinson's disease ([facility] [address]     ICD-10-CM: G20 
ICD-9-CM: 332.0 Essential hypertension with goal blood pressure less than 140/90     ICD-10-CM: I10 
ICD-9-CM: 401.9 Weight loss     ICD-10-CM: R63.4 ICD-9-CM: 783.21 Vitals BP Pulse Temp Resp Height(growth percentile) Weight(growth percentile) 112/70 (BP 1 Location: Left arm, BP Patient Position: Sitting) 78 98.4 °F (36.9 °C) (Oral) 14 5' 5\" (1.651 m) 107 lb 3.2 oz (48.6 kg) SpO2 BMI Smoking Status 99% 17.84 kg/m2 Never Smoker Vitals History BMI and BSA Data Body Mass Index Body Surface Area  
 17.84 kg/m 2 1.49 m 2 [facility] Your Updated Medication List  
  
   
This list is accurate as of 8/22/18  1:30 PM.  Always use your most recent med list. amLODIPine 2.5 mg tablet Commonly known as:  Bib Conine TAKE ONE TABLET BY MOUTH DAILY AZOPT 1 % ophthalmic suspension Generic drug:  brinzolamide  
  
 carbidopa-levodopa ER 36. mg per capsule Commonly known as:  RYTARY Take 1 Cap by mouth three (3) times daily. Indications: Parkinsonism  
  
 timolol 0.5 % ophthalmic gel-forming Commonly known as:  TIMOPTIC-XE Follow-up Instructions Return in about 3 months (around 11/22/2018). To-Do List   
 Around 08/22/2018 Lab:  CBC WITH AUTOMATED DIFF Around 08/22/2018 Lab:  METABOLIC PANEL, COMPREHENSIVE Introducing [facility]! [facility] introduces [employer] patient portal. Now you can access parts of your medical record, email your doctor's office, and request medication refills online. 1. In your internet browser, go to [email or web address] 2. Click on the First Time User? Click Here link in the Sign In box. You will see the New Member Sign Up page. 3. Enter your [employer] Access Code exactly as it appears below. You will not need to use this code after youve completed the sign-up process. If you do not sign up before the expiration date, you must request a new code. · [employer] Access Code: [email or web address] Expires: 11/12/2018 11:16 AM 
 
4. Enter the last four digits of your Social Security Number (xxxx) and Date of Birth (mm/dd/yyyy) as indicated and click Submit. You will be taken to the next sign-up page. 5. Create a [employer] ID. This will be your [employer] login ID and cannot be changed, so think of one that is secure and easy to remember. 6. Create a [employer] password. You can change your password at any time. 7. Enter your Password Reset Question and Answer. This can be used at a later time if you forget your password. 8. Enter your e-mail address. You will receive e-mail notification when new information is available in [address] 9. Click Sign Up. You can now view and download portions of your medical record. 10. Click the Download Summary menu link to download a portable copy of your medical information. If you have questions, please visit the Frequently Asked Questions section of the [employer] website. Remember, [employer] is NOT to be used for urgent needs. For medical emergencies, dial 911. Now available from your iPhone and Android! Please provide this summary of care documentation to your next provider. Your primary care clinician is listed as Flaco Dominguez. Cb Chaves. If you have any questions after today's visit, please call [phone].

## 2018-11-09 NOTE — PROGRESS NOTES
Agnes Billy 3/30/1929, is a 80 y.o. male, who is seen today for 3 or 4-day history of blood in his urine. He thinks there is blood in his urine but he is not sure. It is difficult to get a straight answer from this patient does not hear well and does not seem to understand well. He is here with his wife as usual.  He has had no dysuria no back pain and his wife does not think he has had chills or fever. He has not seen a neurologist for many years but did see Dr. Sarwat Gusman in the past. 
 
Past Medical History:  
Diagnosis Date  Allergic rhinitis  BPH (benign prostatic hyperplasia)  Calculus of kidney  Contact dermatitis and other eczema, due to unspecified cause   
 skin  Essential tremor  Hypertension  [facility] Current Outpatient Medications Medication Sig Dispense Refill  carbidopa-levodopa (RYTARY) 36. mg cpER Take 1 Cap by mouth three (3) times daily. Indications: Parkinsonism 90 Cap 2  
 AZOPT 1 % ophthalmic suspension  timolol (TIMOPTIC-XE) 0.5 % ophthalmic gel-forming Visit Vitals /88 Pulse 84 Temp 98.8 °F (37.1 °C) (Oral) Resp 14 Ht 5' 5\" (1.651 m) Wt 106 lb 12.8 oz (48.4 kg) SpO2 97% BMI 17.77 kg/m² There is no CVA tenderness. No other back tenderness. No suprapubic tenderness. Bladder percusses normally. No hepatosplenomegaly or masses and no bruits. No tenderness or rebound tenderness. Results for orders placed or performed in visit on 11/09/18 AMB POC URINALYSIS DIP STICK AUTO W/O MICRO Result Value Ref Range Color (UA POC) Rachell José Clarity (UA POC) Turbid Glucose (UA POC) Negative Negative Bilirubin (UA POC) 1+ Negative Ketones (UA POC) Negative Negative Specific gravity (UA POC) 1.025 1.001 - 1.035 Blood (UA POC) 3+ Negative pH (UA POC) 6.0 4.6 - 8.0 Protein (UA POC) 2+ Negative Urobilinogen (UA POC) 0.2 mg/dL 0.2 - 1 Nitrites (UA POC) Negative Negative Leukocyte esterase (UA POC) Negative Negative Assessment: Gross hematuria for the last 3-4 days, no evidence of infection. We will send the urine for culture and sensitivity but if this is negative as expected I will refer him to a urologist in this building, he and his wife are agreeable to that plan. If he should develop more symptoms or fever he will call the on-call doctor right away. Horacio Byers, [address] Please note: This document has been produced using voice recognition software. Unrecognized errors in transcription may be present. This visit lasted 30 minutes, greater than 50% of the time spent counseling on the possible causes for gross hematuria including cancer kidney stone infection local irritation etc. and how each of these needs to be evaluated.

## 2018-11-14 NOTE — TELEPHONE ENCOUNTER
Wife called- Pt was seen Friday for blood in his urine- who are you referring him to?  They have not heard from anyone

## 2018-11-15 NOTE — TELEPHONE ENCOUNTER
LM relaying the message that Dr. Yovnai Oneill had put in Epic. I said that Dr. Dipika Crowell office should be calling them to schedule the next available appointment. I also told the patient to call [facility] back if they had any questions.

## 2019-01-01 ENCOUNTER — APPOINTMENT (OUTPATIENT)
Dept: CT IMAGING | Age: 84
DRG: 871 | End: 2019-01-01
Attending: EMERGENCY MEDICINE
Payer: MEDICARE

## 2019-01-01 ENCOUNTER — TELEPHONE (OUTPATIENT)
Dept: INTERNAL MEDICINE CLINIC | Age: 84
End: 2019-01-01

## 2019-01-01 ENCOUNTER — HOSPICE ADMISSION (OUTPATIENT)
Dept: HOSPICE | Facility: HOSPICE | Age: 84
End: 2019-01-01
Payer: MEDICARE

## 2019-01-01 ENCOUNTER — HOME CARE VISIT (OUTPATIENT)
Dept: SCHEDULING | Facility: HOME HEALTH | Age: 84
End: 2019-01-01
Payer: MEDICARE

## 2019-01-01 ENCOUNTER — APPOINTMENT (OUTPATIENT)
Dept: GENERAL RADIOLOGY | Age: 84
DRG: 871 | End: 2019-01-01
Attending: EMERGENCY MEDICINE
Payer: MEDICARE

## 2019-01-01 ENCOUNTER — HOME CARE VISIT (OUTPATIENT)
Dept: HOSPICE | Facility: HOSPICE | Age: 84
End: 2019-01-01
Payer: MEDICARE

## 2019-01-01 ENCOUNTER — HOME HEALTH ADMISSION (OUTPATIENT)
Dept: HOME HEALTH SERVICES | Facility: HOME HEALTH | Age: 84
End: 2019-01-01

## 2019-01-01 ENCOUNTER — HOSPITAL ENCOUNTER (INPATIENT)
Age: 84
LOS: 1 days | Discharge: HOME HEALTH | DRG: 871 | End: 2019-01-30
Attending: EMERGENCY MEDICINE | Admitting: INTERNAL MEDICINE
Payer: MEDICARE

## 2019-01-01 ENCOUNTER — PATIENT OUTREACH (OUTPATIENT)
Dept: INTERNAL MEDICINE CLINIC | Age: 84
End: 2019-01-01

## 2019-01-01 VITALS
SYSTOLIC BLOOD PRESSURE: 98 MMHG | RESPIRATION RATE: 12 BRPM | HEIGHT: 65 IN | WEIGHT: 91.49 LBS | TEMPERATURE: 96.8 F | HEART RATE: 68 BPM | OXYGEN SATURATION: 70 % | BODY MASS INDEX: 15.24 KG/M2 | DIASTOLIC BLOOD PRESSURE: 60 MMHG

## 2019-01-01 VITALS
DIASTOLIC BLOOD PRESSURE: 72 MMHG | RESPIRATION RATE: 20 BRPM | HEIGHT: 65 IN | BODY MASS INDEX: 15.23 KG/M2 | SYSTOLIC BLOOD PRESSURE: 126 MMHG | HEART RATE: 67 BPM | WEIGHT: 91.4 LBS | TEMPERATURE: 97.9 F | OXYGEN SATURATION: 97 %

## 2019-01-01 DIAGNOSIS — E86.0 DEHYDRATION: ICD-10-CM

## 2019-01-01 DIAGNOSIS — N17.9 ACUTE RENAL FAILURE, UNSPECIFIED ACUTE RENAL FAILURE TYPE (HCC): ICD-10-CM

## 2019-01-01 DIAGNOSIS — R79.89 ELEVATED LACTIC ACID LEVEL: ICD-10-CM

## 2019-01-01 DIAGNOSIS — M62.82 NON-TRAUMATIC RHABDOMYOLYSIS: Primary | ICD-10-CM

## 2019-01-01 DIAGNOSIS — L89.101 DECUBITUS ULCER OF BACK, STAGE 1: Primary | ICD-10-CM

## 2019-01-01 LAB
ALBUMIN SERPL-MCNC: 3.3 G/DL (ref 3.4–5)
ALBUMIN/GLOB SERPL: 0.8 {RATIO} (ref 0.8–1.7)
ALP SERPL-CCNC: 149 U/L (ref 45–117)
ALT SERPL-CCNC: 33 U/L (ref 16–61)
ANION GAP SERPL CALC-SCNC: 15 MMOL/L (ref 3–18)
APPEARANCE UR: ABNORMAL
ARTERIAL PATENCY WRIST A: ABNORMAL
AST SERPL-CCNC: 221 U/L (ref 15–37)
ATRIAL RATE: 128 BPM
BACTERIA SPEC CULT: ABNORMAL
BACTERIA SPEC CULT: NORMAL
BACTERIA URNS QL MICRO: ABNORMAL /HPF
BASE DEFICIT BLD-SCNC: 8 MMOL/L
BASOPHILS # BLD: 0 K/UL (ref 0–0.06)
BASOPHILS NFR BLD: 0 % (ref 0–3)
BDY SITE: ABNORMAL
BILIRUB SERPL-MCNC: 1.6 MG/DL (ref 0.2–1)
BILIRUB UR QL: NEGATIVE
BODY TEMPERATURE: 98.9
BUN SERPL-MCNC: 109 MG/DL (ref 7–18)
BUN/CREAT SERPL: 27 (ref 12–20)
CALCIUM SERPL-MCNC: 10.2 MG/DL (ref 8.5–10.1)
CALCULATED P AXIS, ECG09: 104 DEGREES
CALCULATED R AXIS, ECG10: 47 DEGREES
CALCULATED T AXIS, ECG11: 150 DEGREES
CAOX CRY URNS QL MICRO: ABNORMAL
CHLORIDE SERPL-SCNC: 135 MMOL/L (ref 100–108)
CK MB CFR SERPL CALC: 1.3 % (ref 0–4)
CK MB SERPL-MCNC: 56.7 NG/ML (ref 5–25)
CK SERPL-CCNC: 4469 U/L (ref 39–308)
CO2 SERPL-SCNC: 21 MMOL/L (ref 21–32)
COLOR UR: ABNORMAL
CREAT SERPL-MCNC: 4.08 MG/DL (ref 0.6–1.3)
DIAGNOSIS, 93000: NORMAL
DIFFERENTIAL METHOD BLD: ABNORMAL
EOSINOPHIL # BLD: 0 K/UL (ref 0–0.4)
EOSINOPHIL NFR BLD: 0 % (ref 0–5)
EPITH CASTS URNS QL MICRO: ABNORMAL /LPF (ref 0–5)
ERYTHROCYTE [DISTWIDTH] IN BLOOD BY AUTOMATED COUNT: 14.1 % (ref 11.6–14.5)
GAS FLOW.O2 O2 DELIVERY SYS: ABNORMAL L/MIN
GAS FLOW.O2 SETTING OXYMISER: 15 L/M
GLOBULIN SER CALC-MCNC: 4 G/DL (ref 2–4)
GLUCOSE BLD STRIP.AUTO-MCNC: 116 MG/DL (ref 70–110)
GLUCOSE BLD STRIP.AUTO-MCNC: 124 MG/DL (ref 70–110)
GLUCOSE BLD STRIP.AUTO-MCNC: 80 MG/DL (ref 70–110)
GLUCOSE SERPL-MCNC: 154 MG/DL (ref 74–99)
GLUCOSE UR STRIP.AUTO-MCNC: NEGATIVE MG/DL
GRAM STN SPEC: ABNORMAL
GRAM STN SPEC: ABNORMAL
HCO3 BLD-SCNC: 15.2 MMOL/L (ref 22–26)
HCT VFR BLD AUTO: 57 % (ref 36–48)
HGB BLD-MCNC: 18.5 G/DL (ref 13–16)
HGB UR QL STRIP: ABNORMAL
HYALINE CASTS URNS QL MICRO: ABNORMAL /LPF (ref 0–2)
KETONES UR QL STRIP.AUTO: NEGATIVE MG/DL
LACTATE BLD-SCNC: 2.75 MMOL/L (ref 0.4–2)
LACTATE BLD-SCNC: 3.16 MMOL/L (ref 0.4–2)
LACTATE BLD-SCNC: 6.87 MMOL/L (ref 0.4–2)
LEUKOCYTE ESTERASE UR QL STRIP.AUTO: ABNORMAL
LYMPHOCYTES # BLD: 2.8 K/UL (ref 0.8–3.5)
LYMPHOCYTES NFR BLD: 12 % (ref 20–51)
MCH RBC QN AUTO: 31.8 PG (ref 24–34)
MCHC RBC AUTO-ENTMCNC: 32.5 G/DL (ref 31–37)
MCV RBC AUTO: 97.9 FL (ref 74–97)
MONOCYTES # BLD: 0.5 K/UL (ref 0–1)
MONOCYTES NFR BLD: 2 % (ref 2–9)
NEUTS SEG # BLD: 19.8 K/UL (ref 1.8–8)
NEUTS SEG NFR BLD: 86 % (ref 42–75)
NITRITE UR QL STRIP.AUTO: NEGATIVE
NRBC BLD-RTO: 2 PER 100 WBC
O2/TOTAL GAS SETTING VFR VENT: 50 %
P-R INTERVAL, ECG05: 144 MS
PCO2 BLD: 25.6 MMHG (ref 35–45)
PH BLD: 7.38 [PH] (ref 7.35–7.45)
PH UR STRIP: 5 [PH] (ref 5–8)
PLATELET # BLD AUTO: 143 K/UL (ref 135–420)
PLATELET COMMENTS,PCOM: ABNORMAL
PMV BLD AUTO: 12.2 FL (ref 9.2–11.8)
PO2 BLD: 72 MMHG (ref 80–100)
POTASSIUM SERPL-SCNC: 4.8 MMOL/L (ref 3.5–5.5)
PROT SERPL-MCNC: 7.3 G/DL (ref 6.4–8.2)
PROT UR STRIP-MCNC: NEGATIVE MG/DL
Q-T INTERVAL, ECG07: 202 MS
QRS DURATION, ECG06: 58 MS
QTC CALCULATION (BEZET), ECG08: 294 MS
RBC # BLD AUTO: 5.82 M/UL (ref 4.7–5.5)
RBC #/AREA URNS HPF: ABNORMAL /HPF (ref 0–5)
RBC MORPH BLD: ABNORMAL
SAO2 % BLD: 94 % (ref 92–97)
SERVICE CMNT-IMP: ABNORMAL
SERVICE CMNT-IMP: ABNORMAL
SERVICE CMNT-IMP: NORMAL
SODIUM SERPL-SCNC: 171 MMOL/L (ref 136–145)
SP GR UR REFRACTOMETRY: 1.02 (ref 1–1.03)
SPECIMEN TYPE: ABNORMAL
TOTAL RESP. RATE, ITRR: 22
TROPONIN I SERPL-MCNC: 0.98 NG/ML (ref 0–0.04)
UROBILINOGEN UR QL STRIP.AUTO: 1 EU/DL (ref 0.2–1)
VANCOMYCIN SERPL-MCNC: 11.1 UG/ML (ref 5–40)
VENTRICULAR RATE, ECG03: 128 BPM
WBC # BLD AUTO: 23.1 K/UL (ref 4.6–13.2)
WBC URNS QL MICRO: ABNORMAL /HPF (ref 0–4)

## 2019-01-01 PROCEDURE — HOSPICE MEDICATION HC HH HOSPICE MEDICATION

## 2019-01-01 PROCEDURE — 96361 HYDRATE IV INFUSION ADD-ON: CPT

## 2019-01-01 PROCEDURE — A6250 SKIN SEAL PROTECT MOISTURIZR: HCPCS

## 2019-01-01 PROCEDURE — G0155 HHCP-SVS OF CSW,EA 15 MIN: HCPCS

## 2019-01-01 PROCEDURE — 3336500001 HSPC ELECTION

## 2019-01-01 PROCEDURE — 80202 ASSAY OF VANCOMYCIN: CPT

## 2019-01-01 PROCEDURE — 83605 ASSAY OF LACTIC ACID: CPT

## 2019-01-01 PROCEDURE — 82550 ASSAY OF CK (CPK): CPT

## 2019-01-01 PROCEDURE — 96365 THER/PROPH/DIAG IV INF INIT: CPT

## 2019-01-01 PROCEDURE — 36415 COLL VENOUS BLD VENIPUNCTURE: CPT

## 2019-01-01 PROCEDURE — 36600 WITHDRAWAL OF ARTERIAL BLOOD: CPT

## 2019-01-01 PROCEDURE — 77010033678 HC OXYGEN DAILY

## 2019-01-01 PROCEDURE — A4927 NON-STERILE GLOVES: HCPCS

## 2019-01-01 PROCEDURE — T4522 ADULT SIZE BRIEF/DIAPER MED: HCPCS

## 2019-01-01 PROCEDURE — A9270 NON-COVERED ITEM OR SERVICE: HCPCS

## 2019-01-01 PROCEDURE — 99285 EMERGENCY DEPT VISIT HI MDM: CPT

## 2019-01-01 PROCEDURE — 96367 TX/PROPH/DG ADDL SEQ IV INF: CPT

## 2019-01-01 PROCEDURE — 0651 HSPC ROUTINE HOME CARE

## 2019-01-01 PROCEDURE — 71045 X-RAY EXAM CHEST 1 VIEW: CPT

## 2019-01-01 PROCEDURE — 74011000258 HC RX REV CODE- 258: Performed by: INTERNAL MEDICINE

## 2019-01-01 PROCEDURE — G0299 HHS/HOSPICE OF RN EA 15 MIN: HCPCS

## 2019-01-01 PROCEDURE — 87040 BLOOD CULTURE FOR BACTERIA: CPT

## 2019-01-01 PROCEDURE — 85025 COMPLETE CBC W/AUTO DIFF WBC: CPT

## 2019-01-01 PROCEDURE — 82803 BLOOD GASES ANY COMBINATION: CPT

## 2019-01-01 PROCEDURE — 87077 CULTURE AEROBIC IDENTIFY: CPT

## 2019-01-01 PROCEDURE — T4541 LARGE DISPOSABLE UNDERPAD: HCPCS

## 2019-01-01 PROCEDURE — 74011000250 HC RX REV CODE- 250: Performed by: EMERGENCY MEDICINE

## 2019-01-01 PROCEDURE — 77030020186 HC BOOT HL PROTCT SAGE -B

## 2019-01-01 PROCEDURE — 77030037875 HC DRSG MEPILEX <16IN BORD MOLN -A

## 2019-01-01 PROCEDURE — 80053 COMPREHEN METABOLIC PANEL: CPT

## 2019-01-01 PROCEDURE — 82962 GLUCOSE BLOOD TEST: CPT

## 2019-01-01 PROCEDURE — 77030011256 HC DRSG MEPILEX <16IN NO BORD MOLN -A

## 2019-01-01 PROCEDURE — 74011250636 HC RX REV CODE- 250/636: Performed by: EMERGENCY MEDICINE

## 2019-01-01 PROCEDURE — 51702 INSERT TEMP BLADDER CATH: CPT

## 2019-01-01 PROCEDURE — 74011250636 HC RX REV CODE- 250/636: Performed by: INTERNAL MEDICINE

## 2019-01-01 PROCEDURE — 70450 CT HEAD/BRAIN W/O DYE: CPT

## 2019-01-01 PROCEDURE — 81001 URINALYSIS AUTO W/SCOPE: CPT

## 2019-01-01 PROCEDURE — 87186 SC STD MICRODIL/AGAR DIL: CPT

## 2019-01-01 PROCEDURE — 65660000000 HC RM CCU STEPDOWN

## 2019-01-01 PROCEDURE — 73552 X-RAY EXAM OF FEMUR 2/>: CPT

## 2019-01-01 PROCEDURE — 93005 ELECTROCARDIOGRAM TRACING: CPT

## 2019-01-01 PROCEDURE — 94762 N-INVAS EAR/PLS OXIMTRY CONT: CPT

## 2019-01-01 RX ORDER — LEVOFLOXACIN 5 MG/ML
750 INJECTION, SOLUTION INTRAVENOUS ONCE
Status: COMPLETED | OUTPATIENT
Start: 2019-01-01 | End: 2019-01-01

## 2019-01-01 RX ORDER — CARBIDOPA AND LEVODOPA 25; 100 MG/1; MG/1
1 TABLET ORAL 2 TIMES DAILY
Status: DISCONTINUED | OUTPATIENT
Start: 2019-01-01 | End: 2019-01-01 | Stop reason: HOSPADM

## 2019-01-01 RX ORDER — VANCOMYCIN/0.9 % SOD CHLORIDE 1 G/100 ML
1000 PLASTIC BAG, INJECTION (ML) INTRAVENOUS ONCE
Status: DISCONTINUED | OUTPATIENT
Start: 2019-01-01 | End: 2019-01-01

## 2019-01-01 RX ORDER — LEVOFLOXACIN 5 MG/ML
750 INJECTION, SOLUTION INTRAVENOUS EVERY 24 HOURS
Status: DISCONTINUED | OUTPATIENT
Start: 2019-01-01 | End: 2019-01-01

## 2019-01-01 RX ORDER — VANCOMYCIN/0.9 % SOD CHLORIDE 1 G/100 ML
1000 PLASTIC BAG, INJECTION (ML) INTRAVENOUS ONCE
Status: COMPLETED | OUTPATIENT
Start: 2019-01-01 | End: 2019-01-01

## 2019-01-01 RX ORDER — LEVOFLOXACIN 5 MG/ML
500 INJECTION, SOLUTION INTRAVENOUS
Status: DISCONTINUED | OUTPATIENT
Start: 2019-01-01 | End: 2019-01-01 | Stop reason: HOSPADM

## 2019-01-01 RX ORDER — DEXTROSE MONOHYDRATE 50 MG/ML
125 INJECTION, SOLUTION INTRAVENOUS CONTINUOUS
Status: DISPENSED | OUTPATIENT
Start: 2019-01-01 | End: 2019-01-01

## 2019-01-01 RX ORDER — DEXTROSE, SODIUM CHLORIDE, AND POTASSIUM CHLORIDE 5; .45; .3 G/100ML; G/100ML; G/100ML
INJECTION INTRAVENOUS CONTINUOUS
Status: DISCONTINUED | OUTPATIENT
Start: 2019-01-01 | End: 2019-01-01

## 2019-01-01 RX ORDER — SODIUM CHLORIDE 0.9 % (FLUSH) 0.9 %
5-10 SYRINGE (ML) INJECTION AS NEEDED
Status: DISCONTINUED | OUTPATIENT
Start: 2019-01-01 | End: 2019-01-01 | Stop reason: HOSPADM

## 2019-01-01 RX ADMIN — CEFEPIME HYDROCHLORIDE 2 G: 2 INJECTION, POWDER, FOR SOLUTION INTRAVENOUS at 16:30

## 2019-01-01 RX ADMIN — SODIUM CHLORIDE 1000 ML: 900 INJECTION, SOLUTION INTRAVENOUS at 13:45

## 2019-01-01 RX ADMIN — CEFEPIME HYDROCHLORIDE 2 G: 2 INJECTION, POWDER, FOR SOLUTION INTRAVENOUS at 16:37

## 2019-01-01 RX ADMIN — DEXTROSE MONOHYDRATE 125 ML/HR: 5 INJECTION, SOLUTION INTRAVENOUS at 17:50

## 2019-01-01 RX ADMIN — DEXTROSE MONOHYDRATE 125 ML/HR: 5 INJECTION, SOLUTION INTRAVENOUS at 01:57

## 2019-01-01 RX ADMIN — SODIUM CHLORIDE 1000 ML: 900 INJECTION, SOLUTION INTRAVENOUS at 14:30

## 2019-01-01 RX ADMIN — DEXTROSE MONOHYDRATE 125 ML/HR: 5 INJECTION, SOLUTION INTRAVENOUS at 16:38

## 2019-01-01 RX ADMIN — LEVOFLOXACIN 750 MG: 5 INJECTION, SOLUTION INTRAVENOUS at 14:30

## 2019-01-01 RX ADMIN — VANCOMYCIN HYDROCHLORIDE 500 MG: 10 INJECTION, POWDER, LYOPHILIZED, FOR SOLUTION INTRAVENOUS at 12:21

## 2019-01-01 RX ADMIN — SODIUM CHLORIDE 1000 ML: 900 INJECTION, SOLUTION INTRAVENOUS at 14:00

## 2019-01-01 RX ADMIN — VANCOMYCIN HYDROCHLORIDE 1000 MG: 10 INJECTION, POWDER, LYOPHILIZED, FOR SOLUTION INTRAVENOUS at 14:45

## 2019-01-01 RX ADMIN — SODIUM CHLORIDE 1000 ML: 900 INJECTION, SOLUTION INTRAVENOUS at 11:45

## 2019-01-22 NOTE — TELEPHONE ENCOUNTER
[facility] is asking for an order to have an rRN go out and evaluate patient. He has parkinsons and he hasn't been feeling well. The service is saying they need the order for them to go out. They already provide an aid to the home. Wife says the [facility] is located in [place] but she didn't have a fax number.

## 2019-01-25 NOTE — TELEPHONE ENCOUNTER
Iggy Smith from assisted living facility called to inform us that Mr. Gillian Osborn would do better going to hospice rather then home health care. If Dr. Plasencia Aw agrees, Iggy Smith wants to know if the doctor wants to send a referral for hospice or an appointment to evaluate the patient first, and an appointment can be made? [facility] Reas # is [phone] please advise.

## 2019-01-28 NOTE — TELEPHONE ENCOUNTER
Liliana from [facility] called asking when Dr Wilson Dear is going to see pt face to face please advise [phone]

## 2019-01-28 NOTE — TELEPHONE ENCOUNTER
01-28-19 Patient's wife reached, and 2 identifiers were used: Full Name, and Date of Birth. Mrs. Terrie Contreras states that the patient is bed ridden, and unable to walk. She states she is not able to bring the patient in for further evaluation. The patient has a Red area starting on the Right Hip, and Right Buttocks. The patient has a Nurse Aide that is working during the day at the patient's home, and she is shifting the patient to different positions through out the day. The wife has a concern about the Red area on the Right Hip Bone, and Right Buttock. Mrs. Terrie Contreras would like to know what to do next.

## 2019-01-29 PROBLEM — N17.9 ACUTE RENAL FAILURE (ARF) (HCC): Status: ACTIVE | Noted: 2019-01-01

## 2019-01-29 PROBLEM — M62.82 NON-TRAUMATIC RHABDOMYOLYSIS: Status: ACTIVE | Noted: 2019-01-01

## 2019-01-29 PROBLEM — E87.0 HYPERNATREMIA: Status: ACTIVE | Noted: 2019-01-01

## 2019-01-29 NOTE — TELEPHONE ENCOUNTER
Liliana has called again stating pt needs to have a face to face encounter in order for [facility] to [facility].     Per encounter today, pt is unresponsive and wife was advised to call 911.

## 2019-01-29 NOTE — H&P
History & Physical 
Patient: Cynthia Anthony MRN: [id number]  CSN: [id number] YOB: 1929  Age: 80 y.o. Sex: male DOA: 1/29/2019 Chief Complaint:  
Chief Complaint Patient presents with  Unresponsive HPI:  
 
Cynthia Anthony is a 80 y.o. male with h/o HTN and  parkinson's disease who presents to ED for evaluation of unresponsiveness and weakness progressed over the last week. Per EMS, Family states that he has been \"going down hill\" for and became unresponsive today. The nurse came to check on him and called 911. EMS reports that he was hypoxic and blue. There is no reported DNR. Hx limited due to patient being unresponsive In the ER he was found to have sever dehydration with ARF and sever hyponatremia Patient was given NS boluses and started on ABX Per wife and sister at bedside, they would like to transfer him home on home hospice We will IV hydrate him and see if he can improve/  
 
 
 
Past Medical History:  
Diagnosis Date  Allergic rhinitis  BPH (benign prostatic hyperplasia)  Calculus of kidney  Contact dermatitis and other eczema, due to unspecified cause   
 skin  Essential tremor  Hypertension  Parkinson's disease ([facility] [address] Past Surgical History:  
Procedure Laterality Date  HX CARPAL TUNNEL RELEASE Family History Problem Relation Age of Onset  Heart Disease Neg Hx Social History Socioeconomic History  Marital status:  Spouse name: Not on file  Number of children: Not on file  Years of education: Not on file  Highest education level: Not on file Tobacco Use  Smoking status: Never Smoker  Smokeless tobacco: Never Used Substance and Sexual Activity  Alcohol use: No  
 Drug use: No  
 
 
Prior to Admission medications Medication Sig Start Date End Date Taking?  Authorizing Provider  
carbidopa-levodopa (RYTARY) 36. mg cpER Take 1 Cap by mouth three (3) times daily. Indications: Parkinsonism 17   Morewitz, Janeice Castleman, MD  
AZOPT 1 % ophthalmic suspension  11/18/15   Provider, Historical  
timolol (TIMOPTIC-XE) 0.5 % ophthalmic gel-forming  2/3/16   Provider, Historical  
 
 
Allergies Allergen Reactions  Pcn [Penicillins] Nausea and Vomiting  Sulfur Diarrhea ROS: patient unresponsive and sick looking, emaciated Non verbal at the moment Physical Exam:  
 
Physical Exam: 
Visit Vitals /60 (BP 1 Location: Left arm, BP Patient Position: At rest) Pulse 92 Temp 98.1 °F (36.7 °C) Resp (!) 32 SpO2 100% O2 Device: Room air Temp (24hrs), Av.1 °F (36.7 °C), Min:98.1 °F (36.7 °C), Max:98.1 °F (36.7 °C) No intake/output data recorded. No intake/output data recorded. General:  Acutely sick looking Head: Normocephalic, without obvious abnormality, atraumatic. Eyes:  Conjunctivae/corneas clear. PERRL, EOMs intact. Nose: Nares normal. No drainage or sinus tenderness. Neck: Supple, symmetrical, trachea midline, no adenopathy, thyroid: no enlargement, no carotid bruit and no JVD. Lungs:   Clear to auscultation bilaterally. Heart:  Regular rate and rhythm, S1, S2 normal.  
  Abdomen: Soft, non-tender. Bowel sounds normal.   
Extremities: Extremities normal, atraumatic, no cyanosis or edema. Pulses: 2+ and symmetric all extremities. Skin:  dry skin, easily wrinkled Neurologic: deferred Labs Reviewed: 
 
BMP:  
Lab Results Component Value Date/Time  (HH) 2019 11:35 AM  
 K 4.8 2019 11:35 AM  
  (H) 2019 11:35 AM  
 CO2 21 2019 11:35 AM  
 AGAP 15 2019 11:35 AM  
  (H) 2019 11:35 AM  
  (H) 2019 11:35 AM  
 CREA 4.08 (H) 2019 11:35 AM  
 GFRAA 17 (L) 2019 11:35 AM  
 GFRNA 14 (L) 2019 11:35 AM  
 
CMP:  
Lab Results Component Value Date/Time   (HH) 2019 11:35 AM  
 K 4.8 01/29/2019 11:35 AM  
  (H) 01/29/2019 11:35 AM  
 CO2 21 01/29/2019 11:35 AM  
 AGAP 15 01/29/2019 11:35 AM  
  (H) 01/29/2019 11:35 AM  
  (H) 01/29/2019 11:35 AM  
 CREA 4.08 (H) 01/29/2019 11:35 AM  
 GFRAA 17 (L) 01/29/2019 11:35 AM  
 GFRNA 14 (L) 01/29/2019 11:35 AM  
 CA 10.2 (H) 01/29/2019 11:35 AM  
 ALB 3.3 (L) 01/29/2019 11:35 AM  
 TP 7.3 01/29/2019 11:35 AM  
 GLOB 4.0 01/29/2019 11:35 AM  
 AGRAT 0.8 01/29/2019 11:35 AM  
 SGOT 221 (H) 01/29/2019 11:35 AM  
 ALT 33 01/29/2019 11:35 AM  
 
 
 
Procedures/imaging: see electronic medical records for all procedures/Xrays and details which were not copied into this note but were reviewed prior to creation of Plan Assessment/Plan Acute renal failure due to dehydration Sever dehydration Sepsis of unknown source Hypernatremia  
 parkinson's disease DNR/DNI Admit patient to telemetry floor IV hydration with Dextrose 5% water Continue IV antibiotics Care coordination. Social work for hospice arrangement. Family would like to have him on hospice care Continue SINEMET Recheck BMP AM  
 
DVT/GI Prophylaxis: Hep SQ Code: DNR/DNI Natanael Mathew MD 
1/29/2019 4:42 PM

## 2019-01-29 NOTE — ED PROVIDER NOTES
EMERGENCY DEPARTMENT HISTORY AND PHYSICAL EXAM 
 
11:04 AM 
 
 
Date: 1/29/2019 Patient Name: Osbaldo Watkins History of Presenting Illness Chief Complaint Patient presents with  Unresponsive History Provided By: EMS Chief Complaint: [place] Duration: This morning Timing:  Not reported Location: N/A Quality: N/A Severity: N/A Modifying Factors: N/A Associated Symptoms: None reported Additional History (Context): 11:05 AM Osbaldo Watkins is a 80 y.o. male with h/o HTN and end stage parkinson's disease who presents to ED for evaluation of unresponsiveness onset this morning. Per EMS, Family states that he has been \"going down hill\" for a few days and became unresponsive today. The nurse came to check on him and called 911. EMS reports that he was hypoxic and blue. There is no reported DNR. Hx limited due to patient being unresponsive. PCP: Fei Enrique MD 
 
 
Current Facility-Administered Medications Medication Dose Route Frequency Provider Last Rate Last Dose  sodium chloride (NS) flush 5-10 mL  5-10 mL IntraVENous PRN Nehemias Caballero MD      
 sodium chloride 0.9 % bolus infusion 1,000 mL  1,000 mL IntraVENous ONCE Nehemias Caballero MD      
 vancomycin (VANCOCIN) 1000 mg in  ml infusion  1,000 mg IntraVENous ONCE Skip Melinda CRONIN  mL/hr at 01/29/19 1445 1,000 mg at 01/29/19 1445  levoFLOXacin (LEVAQUIN) 750 mg in D5W IVPB  750 mg IntraVENous ONCE Skip Melinda CRONIN  mL/hr at 01/29/19 1430 750 mg at 01/29/19 1430  [START ON 1/31/2019] levoFLOXacin (LEVAQUIN) 500 mg in D5W IVPB  500 mg IntraVENous Q48H Nehemias Caballero MD      
 cefepime (MAXIPIME) 2 g in sterile water (preservative free) 10 mL IV syringe  2 g IntraVENous Q24H Nehemias Caballero MD      
 dextrose 5% - 0.45% NaCl with KCl 40 mEq/L infusion   IntraVENous CONTINUOUS May Wilkinson MD      
 
Current Outpatient Medications Medication Sig Dispense Refill  carbidopa-levodopa (RYTARY) 36. mg cpER Take 1 Cap by mouth three (3) times daily. Indications: Parkinsonism 90 Cap 2  
 AZOPT 1 % ophthalmic suspension  timolol (TIMOPTIC-XE) 0.5 % ophthalmic gel-forming Past History Past Medical History: 
Past Medical History:  
Diagnosis Date  Allergic rhinitis  BPH (benign prostatic hyperplasia)  Calculus of kidney  Contact dermatitis and other eczema, due to unspecified cause   
 skin  Essential tremor  Hypertension  Parkinson's disease ([facility] [address] Past Surgical History: 
Past Surgical History:  
Procedure Laterality Date  HX CARPAL TUNNEL RELEASE Family History: 
Family History Problem Relation Age of Onset  Heart Disease Neg Hx Social History: 
Social History Tobacco Use  Smoking status: Never Smoker  Smokeless tobacco: Never Used Substance Use Topics  Alcohol use: No  
 Drug use: No  
 
 
Allergies: Allergies Allergen Reactions  Pcn [Penicillins] Nausea and Vomiting  Sulfur Diarrhea Review of Systems Review of Systems Unable to perform ROS: Patient unresponsive Physical Exam  
 
Visit Vitals /84 Pulse (!) 124 Resp 25 SpO2 (!) 65% Physical Exam  
Constitutional: He is oriented to person, place, and time. He appears cachectic. No distress. Chronically ill appearing HENT:  
Head: Normocephalic and atraumatic. Mouth/Throat: Mucous membranes are dry. Eyes: Conjunctivae and EOM are normal. Right eye exhibits no discharge. Left eye exhibits no discharge. No scleral icterus. Neck: Normal range of motion. Neck supple. No tracheal deviation present. Cardiovascular: Regular rhythm and normal heart sounds. Tachycardia present. No murmur heard. Delayed capillary refill Pulmonary/Chest: Effort normal and breath sounds normal. Tachypnea noted. No respiratory distress. He has no wheezes. He has no rales. Abdominal: Soft. He exhibits no distension. There is no tenderness. There is no rebound and no guarding. Musculoskeletal: Normal range of motion. He exhibits no edema or deformity. Neurological: He is alert and oriented to person, place, and time. No cranial nerve deficit. Grimaces to painful stimuli Skin: Skin is dry. He is not diaphoretic. Cool to touch Psychiatric: He has a normal mood and affect. His behavior is normal. Judgment and thought content normal.  
 
 
 
Diagnostic Study Results Labs - Recent Results (from the past 12 hour(s)) POC G3 Collection Time: 01/29/19 11:20 AM  
Result Value Ref Range Device: Non rebreather Flow rate (POC) 15 L/M  
 FIO2 (POC) 50 % pH (POC) 7.382 7.35 - 7.45    
 pCO2 (POC) 25.6 (L) 35.0 - 45.0 MMHG  
 pO2 (POC) 72 (L) 80 - 100 MMHG  
 HCO3 (POC) 15.2 (L) 22 - 26 MMOL/L  
 sO2 (POC) 94 92 - 97 % Base deficit (POC) 8 mmol/L Allens test (POC) N/A Total resp. rate 22 Site RIGHT BRACHIAL Patient temp. 98.9 Specimen type (POC) ARTERIAL Performed by Rosamaria Spence POC LACTIC ACID Collection Time: 01/29/19 11:24 AM  
Result Value Ref Range Lactic Acid (POC) 6.87 (HH) 0.40 - 2.00 mmol/L  
URINALYSIS W/ RFLX MICROSCOPIC Collection Time: 01/29/19 11:28 AM  
Result Value Ref Range Color DARK YELLOW Appearance CLOUDY Specific gravity 1.017 1.005 - 1.030    
 pH (UA) 5.0 5.0 - 8.0 Protein NEGATIVE  NEG mg/dL Glucose NEGATIVE  NEG mg/dL Ketone NEGATIVE  NEG mg/dL Bilirubin NEGATIVE  NEG Blood LARGE (A) NEG Urobilinogen 1.0 0.2 - 1.0 EU/dL Nitrites NEGATIVE  NEG Leukocyte Esterase MODERATE (A) NEG URINE MICROSCOPIC ONLY Collection Time: 01/29/19 11:28 AM  
Result Value Ref Range WBC 4 to 10 0 - 4 /hpf  
 RBC 20 to 30 0 - 5 /hpf Epithelial cells FEW 0 - 5 /lpf  Bacteria 2+ (A) NEG /hpf  
 CA Oxalate crystals FEW (A) NEG Hyaline cast 1 to 4 0 - 2 /lpf EKG, 12 LEAD, INITIAL Collection Time: 01/29/19 11:34 AM  
Result Value Ref Range Ventricular Rate 128 BPM  
 Atrial Rate 128 BPM  
 P-R Interval 144 ms QRS Duration 58 ms Q-T Interval 202 ms QTC Calculation (Bezet) 294 ms Calculated P Axis 104 degrees Calculated R Axis 47 degrees Calculated T Axis 150 degrees Diagnosis Sinus tachycardia with occasional premature ventricular complexes ST & T wave abnormality, consider anterolateral ischemia Abnormal ECG When compared with ECG of 01-MAY-2018 11:33, 
premature ventricular complexes are now present Nonspecific T wave abnormality now evident in Inferior leads T wave inversion now evident in Anterolateral leads CBC WITH AUTOMATED DIFF Collection Time: 01/29/19 11:35 AM  
Result Value Ref Range WBC 23.1 (H) 4.6 - 13.2 K/uL  
 RBC 5.82 (H) 4.70 - 5.50 M/uL  
 HGB 18.5 (H) 13.0 - 16.0 g/dL HCT 57.0 (HH) 36.0 - 48.0 % MCV 97.9 (H) 74.0 - 97.0 FL  
 MCH 31.8 24.0 - 34.0 PG  
 MCHC 32.5 31.0 - 37.0 g/dL  
 RDW 14.1 11.6 - 14.5 % PLATELET 653 951 - 154 K/uL MPV 12.2 (H) 9.2 - 11.8 FL  
 NEUTROPHILS 86 (H) 42 - 75 % LYMPHOCYTES 12 (L) 20 - 51 % MONOCYTES 2 2 - 9 % EOSINOPHILS 0 0 - 5 % BASOPHILS 0 0 - 3 % NRBC 2.0 (H) 0  WBC  
 ABS. NEUTROPHILS 19.8 (H) 1.8 - 8.0 K/UL  
 ABS. LYMPHOCYTES 2.8 0.8 - 3.5 K/UL  
 ABS. MONOCYTES 0.5 0 - 1.0 K/UL  
 ABS. EOSINOPHILS 0.0 0.0 - 0.4 K/UL  
 ABS. BASOPHILS 0.0 0.0 - 0.06 K/UL  
 DF MANUAL PLATELET COMMENTS ADEQUATE PLATELETS    
 RBC COMMENTS NORMOCYTIC, NORMOCHROMIC METABOLIC PANEL, COMPREHENSIVE Collection Time: 01/29/19 11:35 AM  
Result Value Ref Range Sodium 171 (HH) 136 - 145 mmol/L Potassium 4.8 3.5 - 5.5 mmol/L Chloride 135 (H) 100 - 108 mmol/L  
 CO2 21 21 - 32 mmol/L Anion gap 15 3.0 - 18 mmol/L Glucose 154 (H) 74 - 99 mg/dL  (H) 7.0 - 18 MG/DL Creatinine 4.08 (H) 0.6 - 1.3 MG/DL  
 BUN/Creatinine ratio 27 (H) 12 - 20 GFR est AA 17 (L) >60 ml/min/1.73m2 GFR est non-AA 14 (L) >60 ml/min/1.73m2 Calcium 10.2 (H) 8.5 - 10.1 MG/DL Bilirubin, total 1.6 (H) 0.2 - 1.0 MG/DL  
 ALT (SGPT) 33 16 - 61 U/L  
 AST (SGOT) 221 (H) 15 - 37 U/L Alk. phosphatase 149 (H) 45 - 117 U/L Protein, total 7.3 6.4 - 8.2 g/dL Albumin 3.3 (L) 3.4 - 5.0 g/dL Globulin 4.0 2.0 - 4.0 g/dL A-G Ratio 0.8 0.8 - 1.7 CARDIAC PANEL,(CK, CKMB & TROPONIN) Collection Time: 01/29/19 11:35 AM  
Result Value Ref Range CK 4,469 (H) 39 - 308 U/L  
 CK - MB 56.7 (H) <3.6 ng/ml CK-MB Index 1.3 0.0 - 4.0 % Troponin-I, QT 0.98 (H) 0.0 - 0.045 NG/ML Radiologic Studies -  
XR FEMUR LT 2 V Final Result IMPRESSION:  
1. No acute pathology appreciated in the left femur. CT HEAD WO CONT Final Result IMPRESSION:  
  
No acute intracranial abnormality. Note: If clinical concern of acute stroke, MRI with diffusion weighted images is  
recommended for better evaluation. Moderate microvascular disease. Thank you for your referral.  
  
XR CHEST PORT Final Result Impression: 1. No acute infiltrate or effusion Medical Decision Making It should be noted that Rohan Santacruz MD will be the provider of record for this patient. I reviewed the vital signs, available nursing notes, past medical history, past surgical history, family history and social history. Vital Signs-Reviewed the patient's vital signs. Pulse Oximetry Analysis -  65% on room air Cardiac Monitor: 
Rate: 124bpm 
Rhythm:  Tachycardic EKG: Interpreted by the EP. Time Interpreted: 11:39 Rate: 128 bpm 
 Rhythm: 
 Interpretation: EKG is unreadable due to patients tremors. Records Reviewed: Nursing Notes and Old Medical Records (Time of Review: 11:04 AM) ED Course: Progress Notes, Reevaluation, and Consults: 
11:39 AM Spoke with patient wife and sister who report that over the past week it has been a decline in his activities he has been able to do like feed himself. She reports that he has slipped out of his wheelchair recently. She states that she had been trying to call his doctor several times with no response and today they were told to bring him to the ED. 
2:36 PM I believe that the elevated lactate is due to dehydration and not sepsis. Consult:  Discussed care with Dr. Vicky Urias, Specialty: Hospitalist  Standard discussion; including history of patients chief complaint, available diagnostic results, and treatment course. Will admit 2:56 PM, 1/29/2019 Provider Notes (Medical Decision Making):  
Patient with advanced parkinson's disease with worsening clinical status for the past week. Labs reveal severe dehydration, renal failure, and rhabdomyolysis. Multiple other derangements are seen likely secondary to immobility and dehydration. Discussed goals of care with the patient's family who would like to pursue hospice after stabilization. Patient will be admitted for stabilization in the hospital.  
 
Core Measures:  
2:58 PM - I suspect that this patient has an active infection. 2:58 PM - The patient met criteria for sepsis at this time. PROVIDER SEPSIS PHYSICAL EXAM EVAL Vital signs reviewed (see nursing documentation for further details): Vitals:  
 01/29/19 1105 01/29/19 1110 BP: 98/65 107/84 Pulse: (!) 125 (!) 124 Resp: 30 25 SpO2: 100% (!) 65% Cardiac exam:Tachycardic Pulmonary exam:Clear Lungs Peripheral pulses:Normal 
 
Capillary refill:Delayed Skin exam:mottled Exam performed Teo Queen MD 
 
SEP-1 Core Measure Exclusion Criteria Elevated lactate due toOther dehydration Has the patient/family refused IV fluids? no 
 
 
For Hospitalized Patients: 
 
1.  Hospitalization Decision Time: 
 The decision to hospitalize the patient was made by Dr. Chong at 2:23 on 1/29/2019 2. Aspirin: Aspirin was not given because the patient did not present with a stroke at the time of their Emergency Department evaluation Diagnosis Clinical Impression: 1. Non-traumatic rhabdomyolysis 2. Acute renal failure, unspecified acute renal failure type ([address] 3. Dehydration 4. Elevated lactic acid level Disposition: Admit Follow-up Information None Medication List  
  
ASK your doctor about these medications AZOPT 1 % ophthalmic suspension Generic drug:  brinzolamide 
  
carbidopa-levodopa ER 36. mg per capsule Commonly known as:  RYTARY Take 1 Cap by mouth three (3) times daily. Indications: Parkinsonism 
  
timolol 0.5 % ophthalmic gel-forming Commonly known as:  TIMOPTIC-XE 
  
  
 
_______________________________ Scribe Attestation Yolanda Cantrell acting as a scribe for and in the presence of Tran Grace MD     
January 29, 2019 at 11:04 AM 
    
Provider Attestation:     
I personally performed the services described in the documentation, reviewed the documentation, as recorded by the scribe in my presence, and it accurately and completely records my words and actions. January 29, 2019 at 11:04 AM - Tran Grace MD   
_______________________________

## 2019-01-30 NOTE — PROGRESS NOTES
Bedside and Verbal shift change report given to [facility] (oncoming nurse) by Teresa Swan RN (offgoing nurse). Report included the following information SBAR, Kardex, MAR, Recent Results and Cardiac Rhythm AFIB.

## 2019-01-30 NOTE — PROGRESS NOTES
Pt will go home on [address], equipment delivered at 4pm transport set up with [address] for 5pm.  Family and Hospice informed. LESVIA [facility] Case Management [phone]

## 2019-01-30 NOTE — PROGRESS NOTES
Pt admitted last night, Per family's request in ED, Hospice to see pt and family this morning. CM will follow along to support in d/c needs. LESVIA Montes Case Management [phone]

## 2019-01-30 NOTE — CONSULTS
Consult Note Consult requested by: Dr. Bridget Springer Ar Lloyd is a 80 y.o. male [facility] who is being seen on consult for JAZ Chief Complaint Patient presents with  Unresponsive Admission diagnosis: unresponsive 80y M with severe dementia, bed bound seen for JAZ, Impression & Plan:  
IMPRESSION:  
JAZ, severe dehydration Hypernatremia Severe Uremia Mild hypercalcemia Rhabdomyolysis Severe dementia, bed bound PLAN:  
 Agree with IV hydration. Discussed with family . Family  Requests  for home hospice. Will be available if any question or concern. Discussed with Dr. Fernando Dale, Dr. Bridget Springer Thank you very much for allowing me to participate in the care of this patient. We will continue to monitor with you and make recommendations as needed. Severe dementia not able to provide any histoyr , history obtained by chart review and discussion with primary team 
HPI:  80y M with PMH severe dementia brought to ER by wife as he has not been eating or drinking well. In ER his lab shows severe hypernatremia , elevated BUN and creatinine. He also has elevated CK. Nephrology service consulted for further recommendation. During my evaluation, Mr. Alexy Roblero appears very weak , dry. His wife is at bedside. She requests for hospice. Past Medical History:  
Diagnosis Date  Allergic rhinitis  BPH (benign prostatic hyperplasia)  Calculus of kidney  Contact dermatitis and other eczema, due to unspecified cause   
 skin  Essential tremor  Hypertension  Parkinson's disease ([facility] [address] Past Surgical History:  
Procedure Laterality Date  HX CARPAL TUNNEL RELEASE Social History Socioeconomic History  Marital status:  Spouse name: Not on file  Number of children: Not on file  Years of education: Not on file  Highest education level: Not on file Social Needs  Financial resource strain: Not on file  Food insecurity - worry: Not on file  Food insecurity - inability: Not on file  Transportation needs - medical: Not on file  Transportation needs - non-medical: Not on file Occupational History  Not on file Tobacco Use  Smoking status: Never Smoker  Smokeless tobacco: Never Used Substance and Sexual Activity  Alcohol use: No  
 Drug use: No  
 Sexual activity: Not on file Other Topics Concern  Not on file Social History Narrative  Not on file Family History Problem Relation Age of Onset  Heart Disease Neg Hx Allergies Allergen Reactions  Pcn [Penicillins] Nausea and Vomiting  Sulfur Diarrhea Home Medications:  
 
Prior to Admission Medications Prescriptions Last Dose Informant Patient Reported? Taking? AZOPT 1 % ophthalmic suspension   Yes No  
carbidopa-levodopa (RYTARY) 36. mg cpER   No No  
Sig: Take 1 Cap by mouth three (3) times daily. Indications: Parkinsonism  
timolol (TIMOPTIC-XE) 0.5 % ophthalmic gel-forming   Yes No  
  
Facility-Administered Medications: None Current Facility-Administered Medications Medication Dose Route Frequency  sodium chloride (NS) flush 5-10 mL  5-10 mL IntraVENous PRN  
 [START ON 1/31/2019] levoFLOXacin (LEVAQUIN) 500 mg in D5W IVPB  500 mg IntraVENous Q48H  cefepime (MAXIPIME) 2 g in sterile water (preservative free) 10 mL IV syringe  2 g IntraVENous Q24H  carbidopa-levodopa (SINEMET)  mg per tablet 1 Tab  1 Tab Oral BID  dextrose 5% infusion  125 mL/hr IntraVENous CONTINUOUS Current Outpatient Medications Medication Sig  carbidopa-levodopa (RYTARY) 36. mg cpER Take 1 Cap by mouth three (3) times daily. Indications: Parkinsonism  AZOPT 1 % ophthalmic suspension  timolol (TIMOPTIC-XE) 0.5 % ophthalmic gel-forming Review of Systems:  
 
Not able to provide Data Review: 
 
Labs: Results:  
   
Chemistry Recent Labs  
  01/29/19 
1134 * * K 4.8  
* CO2 21 * CREA 4.08* CA 10.2* AGAP 15 BUCR 27* * TP 7.3 ALB 3.3*  
GLOB 4.0 AGRAT 0.8 CBC w/Diff Recent Labs  
  01/29/19 
1135 WBC 23.1*  
RBC 5.82* HGB 18.5* HCT 57.0*  
 GRANS 86* LYMPH 12* EOS 0 Coagulation No results for input(s): PTP, INR, APTT in the last 72 hours. No lab exists for component: INREXT Iron/Ferritin No results for input(s): IRON in the last 72 hours. No lab exists for component: TIBCCALC BNP No results for input(s): BNPP in the last 72 hours. Cardiac Enzymes Recent Labs  
  01/29/19 
1135 CPK 4,469* CKND1 1.3 Liver Enzymes Recent Labs  
  01/29/19 
1135 TP 7.3 ALB 3.3* * SGOT 221* Thyroid Studies Lab Results Component Value Date/Time TSH 1.00 12/27/2017 09:00 AM  
    
 EKG:sinus Physical Assessment:  
 
Visit Vitals BP 99/45 Pulse 75 Temp 98 °F (36.7 °C) Resp 21 SpO2 100% Weight change:  
 
Intake/Output Summary (Last 24 hours) at 1/29/2019 2029 Last data filed at 1/29/2019 1630 Gross per 24 hour Intake 4500 ml Output 575 ml Net 3925 ml Physical Exam:  
General: wasted, appears dry HEENT  no thyromegaly CVS: S1S2 heard,  no rub RS: + air entry b/l, Abd: Soft, Non tender Neuro: not lucid Extrm: no edema, no cyanosis, clubbing Skin: no visible  Rash Musculoskeletal: No gross joints or bone deformities Procedures/imaging: see electronic medical records for all procedures, Xrays and details which were not copied into this note but were reviewed prior to creation of Plan 
 
  
 
 
 
Laurie Escobar MD 
January 29, 2019 [facility] [phone]

## 2019-01-30 NOTE — PROGRESS NOTES
visited with the family of Karla Ritter, who is a 80 y.o.,male. The  provided the following Interventions: 
Initiated a relationship of care and support to patient's spouse and sister. Patient's sister disclosed that patient had been diagnosed with Parkinson in 2001. He's been  48 yrs for first marriage and 10 yrs for the second wife. Offered prayer and assurance of continued prayers on patients behalf. Plan: 
Chaplains will continue to follow and will provide pastoral care on an as needed/requested basis.  recommends bedside caregivers page  on duty if patient shows signs of acute spiritual or emotional distress.

## 2019-01-30 NOTE — ROUTINE PROCESS
TRANSFER - IN REPORT: 
 
Verbal report received from ED(name) on Michelle Barboza  being received from Lalo Diaz RN(unit) for routine progression of care Report consisted of patients Situation, Background, Assessment and  
Recommendations(SBAR). Information from the following report(s) SBAR, Kardex, ED Summary, [facility] and Recent Results was reviewed with the receiving nurse. Patient's MEW score in ED 4-6, patient responding only to painful stimuli. Patient is DNR, patient's condition noted by doctorin Progress Notes. Opportunity for questions and clarification was provided. 2106 Assessment completed upon patients arrival to unit and care assumed. Patient arrived via stretcher, non verbal, eyes open with touching stimuli. Patient family member at bs. Bed alarm on, HOB up and suction set-up at bedside.

## 2019-01-30 NOTE — CDMP QUERY
Patient admitted with sepsis . If possible, please document in progress notes and d/c summary if you are evaluating and /or treating any of the following: 
Nutritionist evaluated this patient on 1/30  and noted pt met criteria   for   severe protein calorie malnutrition.  severe protein calorie malnutrition  Other (please specify)  Unable to determine The medical record reflects the following: 
 
  Risk Factors:     elderly;  end stage Parkinson's Unintended weight loss related to inadequate energy intake as evidenced by pt with a 30 lb, (24.7%) weight loss x 1 year PTA per family report. 
  
 
  Clinical Indicators: Patient meets criteria for Severe Protein Calorie Malnutrition as evidenced by:  
ASPEN Malnutrition Criteria Acute Illness, Chronic Illness, or Social/Environmental: Chronic illness Weight Loss: Greater than 20% x 1 yr Body Fat: Severe(orbital, thoracic, upper arm regions) Muscle Mass: Severe(temple, clavicle, scapular, patellar & anterior thigh region) ASPEN Malnutrition Score - Chronic Illness: 18 Chronic Illness - Malnutrition Diagnosis: Severe malnutrition. Treatment:    Nutrition recommendations pending plan of care goals. Monitor ability to tolerate and start po diet as medically appropriate (consider SLP consult as needed). - Continue IV fluids while pt NPO.  
- Continue RD inpatient monitoring and evaluation. Thank you,   Kenrick Tovar RN   CCDS   x [phone]

## 2019-01-30 NOTE — ROUTINE PROCESS
Bedside and Verbal shift change report given to [address] (oncoming nurse) by Lydia Molina RN (offgoing nurse). Report included the following information SBAR, Kardex, MAR, Recent Results and Cardiac Rhythm AFIB. Patient quietly resting.

## 2019-01-30 NOTE — CDMP QUERY
Pt admitted with  sepsis /Pt noted to be unresponsive on admit. . If possible, please document in the progress notes and d/c summary if you are evaluating and / or treating any of the following:   
 Metabolic Encephalopathy  Septic Encephalopathy         Encephalopathy   due to end stage Parkinson\"s  Other, please specify  Clinically unable to determine The medical record reflects the following: 
 
   Risk Factors:    end stage Parkinson's;   sepsis.   elderly Clinical Indicators:  unresponsiveness;   WBC   23.1;   Na  171;   Creat   4.08;   
lactic acid   6.87;  
 
   Treatment: CT head;   Multiple    IV  ABX   with NS   IVF boluses. Thank you,   Frankie Mckeon RN   CCDS   X [phone]

## 2019-01-30 NOTE — PROGRESS NOTES
NUTRITION Nursing Referral: MST, Pressure Injury RECOMMENDATIONS / PLAN:  
 
- Nutrition recommendations pending plan of care goals. Monitor ability to tolerate and start po diet as medically appropriate (consider SLP consult as needed). - Continue IV fluids while pt NPO.  
- Continue RD inpatient monitoring and evaluation. NUTRITION INTERVENTIONS & DIAGNOSIS:  
 
[x] IV fluids: D5 at 125 mL/hr (150 gm dextrose, 510 kcal/day) Nutrition Diagnosis: Unintended weight loss related to inadequate energy intake as evidenced by pt with a 30 lb, (24.7%) weight loss x 1 year PTA per family report. Patient meets criteria for Severe Protein Calorie Malnutrition as evidenced by:  
ASPEN Malnutrition Criteria Acute Illness, Chronic Illness, or Social/Enviornmental: Chronic illness Weight Loss: Greater than 20% x 1 yr Body Fat: Severe(orbital, thoracic, upper arm regions) Muscle Mass: Severe(temple, clavicle, scapular, patellar & anterior thigh region) ASPEN Malnutrition Score - Chronic Illness: 18 Chronic Illness - Malnutrition Diagnosis: Severe malnutrition. ASSESSMENT:  
 
Pt admitted with ARF and dehydration after not eating or drinking x 1 week PTA. Refusing oral intake and pocketing foods PTA and currently NPO. Pt with dementia, parkinson's disease and unable to verbalize. NFPE completed. Plan for home with hospice per family report. Average po intake adequate to meet patients estimated nutritional needs:   [] Yes     [x] No   [] Unable to determine at this time Diet: No diet orders on file    NPO Food Allergies: NKFA Current Appetite:   [] Good     [] Fair     [x] Poor     [x] Other: NPO Appetite/meal intake prior to admission:   [] Good     [] Fair     [x] Poor x 1 week PTA     [] Other: 
Feeding Limitations:  [x] Swallowing difficulty: family reporting pt pocketing and refusing oral intake x 1 week PTA. [] Chewing difficulty    [] Other: 
Current Meal Intake: No data found. BM: 1/30 Skin Integrity: DTI to sacral, right buttocks & left elbow; friction wound to head Edema:   [x] No     [] Yes Pertinent Medications: Reviewed: sinemet Recent Labs  
  01/29/19 
1135 * K 4.8  
* CO2 21 * * CREA 4.08* CA 10.2* ALB 3.3* SGOT 221* ALT 33 Intake/Output Summary (Last 24 hours) at 1/30/2019 1024 Last data filed at 1/30/2019 9133 Gross per 24 hour Intake 5200 ml Output 1075 ml Net 4125 ml Anthropometrics: 
Ht Readings from Last 1 Encounters:  
01/30/19 5' 5\" (1.651 m) Last 3 Recorded Weights in this Encounter 01/30/19 2701 Weight: 41.5 kg (91 lb 6.4 oz) Body mass index is 15.21 kg/m². Underweight Weight History: Family reports pt with a weight loss of about 30 lb, (24.7%) x 1 year PTA. Per chart review pt with a 28 lb, 23.5% weight loss x 1 year and 1 month PTA. Weight Metrics 1/30/2019 1/29/2019 11/9/2018 8/22/2018 5/14/2018 12/27/2017 10/11/2017 Weight 91 lb 6.4 oz - 106 lb 12.8 oz 107 lb 3.2 oz 113 lb 3.2 oz 119 lb 120 lb BMI - 15.21 kg/m2 17.77 kg/m2 17.84 kg/m2 18.84 kg/m2 19.8 kg/m2 19.97 kg/m2 Admitting Diagnosis: Acute renal failure (ARF) ([facility] [address] Pertinent PMHx: HTN, parkinson's disease Education Needs:        [x] None identified  [] Identified - Not appropriate at this time  []  Identified and addressed - refer to education log Learning Limitations:   [] None identified  [x] Identified: dementia, non verbal   
Cultural, Christian & ethnic food preferences:  [x] None identified    [] Identified and addressed ESTIMATED NUTRITION NEEDS:  
 
Calories: 1932-7780 kcal (MSJx1.2-1.3) based on  [] Actual BW      [x] IBW: 62 kg Protein: 50-62 gm (0.8-1 gm/kg) based on  [] Actual BW      [x] IBW Fluid: 1 mL/kcal 
  
MONITORING & EVALUATION:  
 
Nutrition Goal(s): 1.  Po intake of meals will meet >75% of patient estimated nutritional needs within the next 7 days. Outcome:  [] Met/Ongoing    []  Not Met    [x] New/Initial Goal 
2. Provide nutrition intervention as appropriate with goals of care for the next 5-7 days. Outcome:  [] Met/Ongoing    []  Not Met    [x] New/Initial Goal   
 
Monitoring:   [] Food and beverage intake   [x] Diet order   [x] Nutrition-focused physical findings   [x] Treatment/therapy   [] Weight   [] Enteral nutrition intake Previous Recommendations (for follow-up assessments only):     []   Implemented       []   Not Implemented (RD to address)      [] No Longer Appropriate     [] No Recommendation Made Discharge Planning: pending nutritional goals of care [x] Participated in care planning, discharge planning, & interdisciplinary rounds as appropriate Dgao Blackwood RD Pager: [phone]

## 2019-01-30 NOTE — ED NOTES
TRANSFER - OUT REPORT: 
 
Verbal report given to Holden Galloway RN (name) on Bhaskar Orourke  being transferred to [address] [place] [address] (unit) for routine progression of care Report consisted of patients Situation, Background, Assessment and  
Recommendations(SBAR). Information from the following report(s) SBAR, Kardex, ED Summary, Intake/Output, MAR and Recent Results was reviewed with the receiving nurse. Lines:  
Peripheral IV 01/29/19 Left Antecubital (Active) Site Assessment Clean, dry, & intact 1/29/2019 11:35 AM  
Phlebitis Assessment 0 1/29/2019 11:35 AM  
Infiltration Assessment 0 1/29/2019 11:35 AM  
Dressing Status Clean, dry, & intact 1/29/2019 11:35 AM  
Dressing Type 4 X 4;Tape;Transparent 1/29/2019 11:35 AM  
Hub Color/Line Status Pink;Flushed;Patent 1/29/2019 11:35 AM  
Action Taken Blood drawn 1/29/2019 11:35 AM  
Alcohol Cap Used No 1/29/2019 11:35 AM  
  
 
Opportunity for questions and clarification was provided. Patient transported with: 
 Monitor Registered Nurse

## 2019-01-30 NOTE — HOSPICE
Bedside visit made. The patient's sister was present at bedside and stated the family did want hospice services. Writer to go back once wife is present at bedside for a family meeting. 1313- STAT equipment ordered. D/C order placed. The patient is on will-call for transport after equipment has been delivered.

## 2019-01-30 NOTE — DISCHARGE SUMMARY
[facility] Discharge Summary Patient: José Espinal Age: 80 y.o. : 3/30/1929 MR#: [id number] SSN: [id number] PCP on record: Ramona Sykes MD 
Admit date: 2019 Discharge date: 2019 Consults:  -Dr Chapo Plascencia., -nephrology -  
Procedures: none - Significant Diagnostic Studies: -CT head : 
No acute intracranial abnormality. Note: If clinical concern of acute stroke, MRI with diffusion weighted images is 
recommended for better evaluation.  
  
 Moderate microvascular disease. -CXR 19: 
Impression: 1. No acute infiltrate or effusion   
-Left femur x-ray 19 IMPRESSION: 
1. No acute pathology appreciated in the left femur. Discharge Diagnoses: -JAZ 
-Dehydration 
-Sepsis unknown source -Hypernatremia   
-Metabolic encephalopathy 
-Severe protein calorie malnutrition Patient Active Problem List  
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  
 Essential tremor G25.0  Parkinson's disease ([address] G20  
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 Per EMS report pt was hypoxic and cyanotic, in ED noted to have evidence of dehydration, hypernatremia and JAZ.  Pt was given IV hydration and was admitted and evaluated by nephrology service. Family soon after admission requested that pt be dc'd home w/ hospice. Hospice was consulted and pt was dc'd home w/ hospice. Today's examination of the patient revealed:  
 
Subjective:  
 
Objective:  
VS:  
Visit Vitals /58 (BP 1 Location: Left arm, BP Patient Position: At rest) Pulse 64 Temp 97.2 °F (36.2 °C) Resp 22 Ht 5' 5\" (1.651 m) Comment: previous encounter Wt 41.5 kg (91 lb 6.4 oz) SpO2 100% BMI 15.21 kg/m² Tmax/24hrs: Temp (24hrs), Av.8 °F (36.6 °C), Min:97.2 °F (36.2 °C), Max:98.4 °F (36.9 °C) Input/Output:  
 
Intake/Output Summary (Last 24 hours) at 2019 1318 Last data filed at 2019 1274 Gross per 24 hour Intake 5200 ml Output 1075 ml Net 4125 ml General:  Unresponsive, did not appear to be in discomfort. Labs:   
Recent Results (from the past 24 hour(s)) CULTURE, BLOOD Collection Time: 19  1:40 PM  
Result Value Ref Range Special Requests: NO SPECIAL REQUESTS Culture result: NO GROWTH AFTER 17 HOURS    
CULTURE, BLOOD Collection Time: 19  2:00 PM  
Result Value Ref Range Special Requests: NO SPECIAL REQUESTS Culture result: NO GROWTH AFTER 17 HOURS    
POC LACTIC ACID Collection Time: 19  5:09 PM  
Result Value Ref Range Lactic Acid (POC) 3.16 (HH) 0.40 - 2.00 mmol/L POC LACTIC ACID Collection Time: 19  8:40 PM  
Result Value Ref Range Lactic Acid (POC) 2.75 (HH) 0.40 - 2.00 mmol/L  
VANCOMYCIN, RANDOM Collection Time: 19  2:57 AM  
Result Value Ref Range Vancomycin, random 11.1 5.0 - 40.0 UG/ML  
GLUCOSE, POC Collection Time: 19  7:56 AM  
Result Value Ref Range Glucose (POC) 124 (H) 70 - 110 mg/dL Additional Data Reviewed: 
  
Condition: poor Disposition:  
 []Home   [x]Home with Home Health   []SNF/NH   []Rehab   []Home with family []Alternate Facility:__home w/ hospice__________________ Discharge Medications:  
 
Current Discharge Medication List  
  
CONTINUE these medications which have NOT CHANGED Details  
carbidopa-levodopa (RYTARY) 36. mg cpER Take 1 Cap by mouth three (3) times daily. Indications: Parkinsonism 
Qty: 90 Cap, Refills: 2 AZOPT 1 % ophthalmic suspension   
  
timolol (TIMOPTIC-XE) 0.5 % ophthalmic gel-forming Follow-up Appointments:  
1. Your PCP: Marychuy Mullen MD, within 7-20TEUM >30 minutes spent coordinating this discharge (review instructions/follow-up, prescriptions, preparing report for sign off) Signed: 
Gasper Pascual MD 
1/30/2019 
1:18 PM

## 2019-01-30 NOTE — PHYSICIAN ADVISORY
Letter of admission status determination Ar Lloyd Age: 80 y.o. MRN: [id number] [facility]:  [id number] Date of admission:  1/29/2019 I have reviewed this case as it involves a Medicare patient admitted as inpatient that has not been hospitalized for at least two midnights and has a discharge order placed. The patient is being discharged with hospice services on day 2. However, based on patient's presenting condition, it was reasonable for the admitting physician to expect this severely ill patient to require medically necessary hospital services for at least two midnights and admit as Inpatient. Therefore, I agree with the attending physician's decision to admit Ar Lloyd as INPATIENT. Sana Nolan MD, DAILY, [facility], [facility], [facility] Physician Advisor [address] [place] [address],[address] [place] [address] [phone]

## 2019-01-30 NOTE — PROGRESS NOTES
Problem: Falls - Risk of 
Goal: *Absence of Falls Document Michelle Molina Fall Risk and appropriate interventions in the flowsheet. Outcome: Progressing Towards Goal 
Fall Risk Interventions: 
  
 
Mentation Interventions: Adequate sleep, hydration, pain control, Bed/chair exit alarm, Door open when patient unattended, Familiar objects from home, Increase mobility, More frequent rounding, Reorient patient Medication Interventions: Assess postural VS orthostatic hypotension, Evaluate medications/consider consulting pharmacy, Bed/chair exit alarm Elimination Interventions: Bed/chair exit alarm, Call light in reach, Patient to call for help with toileting needs History of Falls Interventions: Bed/chair exit alarm, Consult care management for discharge planning Problem: Pressure Injury - Risk of 
Goal: *Prevention of pressure injury Document Danis Scale and appropriate interventions in the flowsheet. Outcome: Progressing Towards Goal 
Pressure Injury Interventions: 
Sensory Interventions: Assess changes in LOC, Discuss PT/OT consult with provider, Check visual cues for pain, Monitor skin under medical devices, Turn and reposition approx. every two hours (pillows and wedges if needed) Moisture Interventions: Absorbent underpads, Check for incontinence Q2 hours and as needed, Maintain skin hydration (lotion/cream) Activity Interventions: Assess need for specialty bed, Increase time out of bed, Pressure redistribution bed/mattress(bed type), PT/OT evaluation Mobility Interventions: Assess need for specialty bed, Float heels, HOB 30 degrees or less, Suspension boots, Pressure redistribution bed/mattress (bed type), PT/OT evaluation, Turn and reposition approx. every two hours(pillow and wedges) Nutrition Interventions: Document food/fluid/supplement intake, Discuss nutritional consult with provider Friction and Shear Interventions: Apply protective barrier, creams and emollients, Foam dressings/transparent film/skin sealants, Lift team/patient mobility team

## 2019-03-11 ENCOUNTER — PATIENT OUTREACH (OUTPATIENT)
Dept: INTERNAL MEDICINE CLINIC | Age: 84
End: 2019-03-11

## 2024-06-24 NOTE — PATIENT INSTRUCTIONS
Reviewed need to pause before starting to walk when going from seated to standing Awake/Alert/Cooperative